# Patient Record
Sex: FEMALE | Race: BLACK OR AFRICAN AMERICAN | URBAN - METROPOLITAN AREA
[De-identification: names, ages, dates, MRNs, and addresses within clinical notes are randomized per-mention and may not be internally consistent; named-entity substitution may affect disease eponyms.]

---

## 2014-04-04 RX ORDER — CARVEDILOL PHOSPHATE 80 MG/1
1 CAPSULE, EXTENDED RELEASE ORAL
Qty: 0 | Refills: 0 | COMMUNITY
Start: 2014-04-04

## 2017-10-18 ENCOUNTER — INPATIENT (INPATIENT)
Facility: HOSPITAL | Age: 67
LOS: 4 days | Discharge: NURSING, REHAB OR LONG-TERM CARE | End: 2017-10-23
Attending: HOSPITALIST | Admitting: HOSPITALIST
Payer: MEDICARE

## 2017-10-18 VITALS
OXYGEN SATURATION: 100 % | TEMPERATURE: 99 F | SYSTOLIC BLOOD PRESSURE: 137 MMHG | RESPIRATION RATE: 17 BRPM | DIASTOLIC BLOOD PRESSURE: 57 MMHG | HEART RATE: 105 BPM

## 2017-10-18 DIAGNOSIS — D64.9 ANEMIA, UNSPECIFIED: ICD-10-CM

## 2017-10-18 DIAGNOSIS — Z29.9 ENCOUNTER FOR PROPHYLACTIC MEASURES, UNSPECIFIED: ICD-10-CM

## 2017-10-18 DIAGNOSIS — Z96.649 PRESENCE OF UNSPECIFIED ARTIFICIAL HIP JOINT: ICD-10-CM

## 2017-10-18 DIAGNOSIS — M10.9 GOUT, UNSPECIFIED: ICD-10-CM

## 2017-10-18 DIAGNOSIS — I25.10 ATHEROSCLEROTIC HEART DISEASE OF NATIVE CORONARY ARTERY WITHOUT ANGINA PECTORIS: ICD-10-CM

## 2017-10-18 DIAGNOSIS — I50.22 CHRONIC SYSTOLIC (CONGESTIVE) HEART FAILURE: ICD-10-CM

## 2017-10-18 LAB
ALBUMIN SERPL ELPH-MCNC: 3 G/DL — LOW (ref 3.3–5)
ALP SERPL-CCNC: 147 U/L — HIGH (ref 40–120)
ALT FLD-CCNC: 6 U/L — SIGNIFICANT CHANGE UP (ref 4–33)
ANISOCYTOSIS BLD QL: SLIGHT — SIGNIFICANT CHANGE UP
APPEARANCE UR: SIGNIFICANT CHANGE UP
AST SERPL-CCNC: 21 U/L — SIGNIFICANT CHANGE UP (ref 4–32)
BACTERIA # UR AUTO: SIGNIFICANT CHANGE UP
BASE EXCESS BLDV CALC-SCNC: 0.2 MMOL/L — SIGNIFICANT CHANGE UP
BASOPHILS # BLD AUTO: 0.01 K/UL — SIGNIFICANT CHANGE UP (ref 0–0.2)
BASOPHILS NFR BLD AUTO: 0.1 % — SIGNIFICANT CHANGE UP (ref 0–2)
BASOPHILS NFR SPEC: 0 % — SIGNIFICANT CHANGE UP (ref 0–2)
BILIRUB SERPL-MCNC: 1.1 MG/DL — SIGNIFICANT CHANGE UP (ref 0.2–1.2)
BILIRUB UR-MCNC: NEGATIVE — SIGNIFICANT CHANGE UP
BLD GP AB SCN SERPL QL: NEGATIVE — SIGNIFICANT CHANGE UP
BLOOD GAS VENOUS - CREATININE: 0.99 MG/DL — SIGNIFICANT CHANGE UP (ref 0.5–1.3)
BLOOD UR QL VISUAL: HIGH
BUN SERPL-MCNC: 16 MG/DL — SIGNIFICANT CHANGE UP (ref 7–23)
CALCIUM SERPL-MCNC: 8.1 MG/DL — LOW (ref 8.4–10.5)
CHLORIDE BLDV-SCNC: 102 MMOL/L — SIGNIFICANT CHANGE UP (ref 96–108)
CHLORIDE SERPL-SCNC: 99 MMOL/L — SIGNIFICANT CHANGE UP (ref 98–107)
CO2 SERPL-SCNC: 23 MMOL/L — SIGNIFICANT CHANGE UP (ref 22–31)
COLOR SPEC: YELLOW — SIGNIFICANT CHANGE UP
CREAT SERPL-MCNC: 0.92 MG/DL — SIGNIFICANT CHANGE UP (ref 0.5–1.3)
EOSINOPHIL # BLD AUTO: 0.06 K/UL — SIGNIFICANT CHANGE UP (ref 0–0.5)
EOSINOPHIL NFR BLD AUTO: 0.7 % — SIGNIFICANT CHANGE UP (ref 0–6)
EOSINOPHIL NFR FLD: 0 % — SIGNIFICANT CHANGE UP (ref 0–6)
GAS PNL BLDV: 134 MMOL/L — LOW (ref 136–146)
GIANT PLATELETS BLD QL SMEAR: PRESENT — SIGNIFICANT CHANGE UP
GLUCOSE BLDV-MCNC: 98 — SIGNIFICANT CHANGE UP (ref 70–99)
GLUCOSE SERPL-MCNC: 96 MG/DL — SIGNIFICANT CHANGE UP (ref 70–99)
GLUCOSE UR-MCNC: NEGATIVE — SIGNIFICANT CHANGE UP
HCO3 BLDV-SCNC: 24 MMOL/L — SIGNIFICANT CHANGE UP (ref 20–27)
HCT VFR BLD CALC: 16.1 % — CRITICAL LOW (ref 34.5–45)
HCT VFR BLDV CALC: 16.7 % — CRITICAL LOW (ref 34.5–45)
HGB BLD-MCNC: 5.2 G/DL — CRITICAL LOW (ref 11.5–15.5)
HGB BLDV-MCNC: 5.3 G/DL — CRITICAL LOW (ref 11.5–15.5)
IMM GRANULOCYTES # BLD AUTO: 0.28 # — SIGNIFICANT CHANGE UP
IMM GRANULOCYTES NFR BLD AUTO: 3.4 % — HIGH (ref 0–1.5)
INR BLD: 0.96 — SIGNIFICANT CHANGE UP (ref 0.88–1.17)
KETONES UR-MCNC: SIGNIFICANT CHANGE UP
LACTATE BLDV-MCNC: 0.9 MMOL/L — SIGNIFICANT CHANGE UP (ref 0.5–2)
LEUKOCYTE ESTERASE UR-ACNC: HIGH
LYMPHOCYTES # BLD AUTO: 1.17 K/UL — SIGNIFICANT CHANGE UP (ref 1–3.3)
LYMPHOCYTES # BLD AUTO: 14.1 % — SIGNIFICANT CHANGE UP (ref 13–44)
LYMPHOCYTES NFR SPEC AUTO: 12 % — LOW (ref 13–44)
MCHC RBC-ENTMCNC: 29.7 PG — SIGNIFICANT CHANGE UP (ref 27–34)
MCHC RBC-ENTMCNC: 32.3 % — SIGNIFICANT CHANGE UP (ref 32–36)
MCV RBC AUTO: 92 FL — SIGNIFICANT CHANGE UP (ref 80–100)
METAMYELOCYTES # FLD: 3 % — HIGH (ref 0–1)
MONOCYTES # BLD AUTO: 0.6 K/UL — SIGNIFICANT CHANGE UP (ref 0–0.9)
MONOCYTES NFR BLD AUTO: 7.2 % — SIGNIFICANT CHANGE UP (ref 2–14)
MONOCYTES NFR BLD: 1 % — LOW (ref 2–9)
MUCOUS THREADS # UR AUTO: SIGNIFICANT CHANGE UP
NEUTROPHIL AB SER-ACNC: 83 % — HIGH (ref 43–77)
NEUTROPHILS # BLD AUTO: 6.16 K/UL — SIGNIFICANT CHANGE UP (ref 1.8–7.4)
NEUTROPHILS NFR BLD AUTO: 74.5 % — SIGNIFICANT CHANGE UP (ref 43–77)
NEUTS BAND # BLD: 1 % — SIGNIFICANT CHANGE UP (ref 0–6)
NITRITE UR-MCNC: NEGATIVE — SIGNIFICANT CHANGE UP
NON-SQ EPI CELLS # UR AUTO: <1 — SIGNIFICANT CHANGE UP
NRBC # FLD: 0.05 — SIGNIFICANT CHANGE UP
OB PNL STL: POSITIVE — SIGNIFICANT CHANGE UP
PCO2 BLDV: 40 MMHG — LOW (ref 41–51)
PH BLDV: 7.4 PH — SIGNIFICANT CHANGE UP (ref 7.32–7.43)
PH UR: 6 — SIGNIFICANT CHANGE UP (ref 4.6–8)
PLATELET # BLD AUTO: 167 K/UL — SIGNIFICANT CHANGE UP (ref 150–400)
PLATELET COUNT - ESTIMATE: NORMAL — SIGNIFICANT CHANGE UP
PMV BLD: 11.5 FL — SIGNIFICANT CHANGE UP (ref 7–13)
PO2 BLDV: 34 MMHG — LOW (ref 35–40)
POIKILOCYTOSIS BLD QL AUTO: SLIGHT — SIGNIFICANT CHANGE UP
POLYCHROMASIA BLD QL SMEAR: SLIGHT — SIGNIFICANT CHANGE UP
POTASSIUM BLDV-SCNC: 3.8 MMOL/L — SIGNIFICANT CHANGE UP (ref 3.4–4.5)
POTASSIUM SERPL-MCNC: 4.1 MMOL/L — SIGNIFICANT CHANGE UP (ref 3.5–5.3)
POTASSIUM SERPL-SCNC: 4.1 MMOL/L — SIGNIFICANT CHANGE UP (ref 3.5–5.3)
PROT SERPL-MCNC: 5.9 G/DL — LOW (ref 6–8.3)
PROT UR-MCNC: 20 — SIGNIFICANT CHANGE UP
PROTHROM AB SERPL-ACNC: 10.7 SEC — SIGNIFICANT CHANGE UP (ref 9.8–13.1)
RBC # BLD: 1.75 M/UL — LOW (ref 3.8–5.2)
RBC # FLD: 14.6 % — HIGH (ref 10.3–14.5)
RBC CASTS # UR COMP ASSIST: SIGNIFICANT CHANGE UP (ref 0–?)
REVIEW TO FOLLOW: YES — SIGNIFICANT CHANGE UP
RH IG SCN BLD-IMP: POSITIVE — SIGNIFICANT CHANGE UP
SAO2 % BLDV: 62.7 % — SIGNIFICANT CHANGE UP (ref 60–85)
SODIUM SERPL-SCNC: 137 MMOL/L — SIGNIFICANT CHANGE UP (ref 135–145)
SP GR SPEC: 1.01 — SIGNIFICANT CHANGE UP (ref 1–1.03)
SQUAMOUS # UR AUTO: SIGNIFICANT CHANGE UP
UROBILINOGEN FLD QL: NORMAL E.U. — SIGNIFICANT CHANGE UP (ref 0.1–0.2)
WBC # BLD: 8.28 K/UL — SIGNIFICANT CHANGE UP (ref 3.8–10.5)
WBC # FLD AUTO: 8.28 K/UL — SIGNIFICANT CHANGE UP (ref 3.8–10.5)
WBC UR QL: HIGH (ref 0–?)

## 2017-10-18 PROCEDURE — 93971 EXTREMITY STUDY: CPT | Mod: 26,LT

## 2017-10-18 PROCEDURE — 71010: CPT | Mod: 26

## 2017-10-18 PROCEDURE — 99223 1ST HOSP IP/OBS HIGH 75: CPT | Mod: AI

## 2017-10-18 PROCEDURE — 73502 X-RAY EXAM HIP UNI 2-3 VIEWS: CPT | Mod: 26,LT

## 2017-10-18 RX ORDER — ASCORBIC ACID 60 MG
1 TABLET,CHEWABLE ORAL
Qty: 0 | Refills: 0 | COMMUNITY

## 2017-10-18 RX ORDER — KETOROLAC TROMETHAMINE 0.5 %
1 DROPS OPHTHALMIC (EYE) DAILY
Qty: 0 | Refills: 0 | Status: DISCONTINUED | OUTPATIENT
Start: 2017-10-18 | End: 2017-10-23

## 2017-10-18 RX ORDER — KETOROLAC TROMETHAMINE 0.5 %
1 DROPS OPHTHALMIC (EYE)
Qty: 0 | Refills: 0 | COMMUNITY

## 2017-10-18 RX ORDER — SENNA PLUS 8.6 MG/1
2 TABLET ORAL AT BEDTIME
Qty: 0 | Refills: 0 | Status: DISCONTINUED | OUTPATIENT
Start: 2017-10-18 | End: 2017-10-23

## 2017-10-18 RX ORDER — ASPIRIN/CALCIUM CARB/MAGNESIUM 324 MG
1 TABLET ORAL
Qty: 0 | Refills: 0 | COMMUNITY

## 2017-10-18 RX ORDER — ENOXAPARIN SODIUM 100 MG/ML
40 INJECTION SUBCUTANEOUS EVERY 24 HOURS
Qty: 0 | Refills: 0 | Status: DISCONTINUED | OUTPATIENT
Start: 2017-10-18 | End: 2017-10-23

## 2017-10-18 RX ORDER — CARVEDILOL PHOSPHATE 80 MG/1
12.5 CAPSULE, EXTENDED RELEASE ORAL EVERY 12 HOURS
Qty: 0 | Refills: 0 | Status: DISCONTINUED | OUTPATIENT
Start: 2017-10-18 | End: 2017-10-23

## 2017-10-18 RX ORDER — ASCORBIC ACID 60 MG
500 TABLET,CHEWABLE ORAL
Qty: 0 | Refills: 0 | Status: DISCONTINUED | OUTPATIENT
Start: 2017-10-18 | End: 2017-10-23

## 2017-10-18 RX ORDER — FOLIC ACID 0.8 MG
1 TABLET ORAL DAILY
Qty: 0 | Refills: 0 | Status: DISCONTINUED | OUTPATIENT
Start: 2017-10-18 | End: 2017-10-23

## 2017-10-18 RX ORDER — ACETAMINOPHEN 500 MG
650 TABLET ORAL EVERY 6 HOURS
Qty: 0 | Refills: 0 | Status: DISCONTINUED | OUTPATIENT
Start: 2017-10-18 | End: 2017-10-23

## 2017-10-18 RX ORDER — AMLODIPINE BESYLATE 2.5 MG/1
5 TABLET ORAL DAILY
Qty: 0 | Refills: 0 | Status: DISCONTINUED | OUTPATIENT
Start: 2017-10-18 | End: 2017-10-23

## 2017-10-18 RX ORDER — DOCUSATE SODIUM 100 MG
1 CAPSULE ORAL
Qty: 0 | Refills: 0 | COMMUNITY

## 2017-10-18 RX ORDER — ALLOPURINOL 300 MG
2 TABLET ORAL
Qty: 0 | Refills: 0 | COMMUNITY

## 2017-10-18 RX ORDER — SODIUM CHLORIDE 9 MG/ML
3 INJECTION INTRAMUSCULAR; INTRAVENOUS; SUBCUTANEOUS EVERY 8 HOURS
Qty: 0 | Refills: 0 | Status: DISCONTINUED | OUTPATIENT
Start: 2017-10-18 | End: 2017-10-23

## 2017-10-18 RX ORDER — OXYCODONE AND ACETAMINOPHEN 5; 325 MG/1; MG/1
1 TABLET ORAL EVERY 6 HOURS
Qty: 0 | Refills: 0 | Status: DISCONTINUED | OUTPATIENT
Start: 2017-10-18 | End: 2017-10-19

## 2017-10-18 RX ORDER — ACETAMINOPHEN 500 MG
1000 TABLET ORAL ONCE
Qty: 0 | Refills: 0 | Status: DISCONTINUED | OUTPATIENT
Start: 2017-10-18 | End: 2017-10-18

## 2017-10-18 RX ORDER — PANTOPRAZOLE SODIUM 20 MG/1
40 TABLET, DELAYED RELEASE ORAL
Qty: 0 | Refills: 0 | Status: DISCONTINUED | OUTPATIENT
Start: 2017-10-18 | End: 2017-10-23

## 2017-10-18 RX ORDER — ALLOPURINOL 300 MG
200 TABLET ORAL DAILY
Qty: 0 | Refills: 0 | Status: DISCONTINUED | OUTPATIENT
Start: 2017-10-18 | End: 2017-10-23

## 2017-10-18 RX ORDER — ASPIRIN/CALCIUM CARB/MAGNESIUM 324 MG
81 TABLET ORAL DAILY
Qty: 0 | Refills: 0 | Status: DISCONTINUED | OUTPATIENT
Start: 2017-10-18 | End: 2017-10-23

## 2017-10-18 RX ORDER — CHOLECALCIFEROL (VITAMIN D3) 125 MCG
1 CAPSULE ORAL
Qty: 0 | Refills: 0 | COMMUNITY

## 2017-10-18 RX ORDER — DOCUSATE SODIUM 100 MG
100 CAPSULE ORAL
Qty: 0 | Refills: 0 | Status: DISCONTINUED | OUTPATIENT
Start: 2017-10-18 | End: 2017-10-23

## 2017-10-18 RX ORDER — PANTOPRAZOLE SODIUM 20 MG/1
80 TABLET, DELAYED RELEASE ORAL ONCE
Qty: 0 | Refills: 0 | Status: COMPLETED | OUTPATIENT
Start: 2017-10-18 | End: 2017-10-18

## 2017-10-18 RX ORDER — INFLUENZA VIRUS VACCINE 15; 15; 15; 15 UG/.5ML; UG/.5ML; UG/.5ML; UG/.5ML
0.5 SUSPENSION INTRAMUSCULAR ONCE
Qty: 0 | Refills: 0 | Status: COMPLETED | OUTPATIENT
Start: 2017-10-18 | End: 2017-10-18

## 2017-10-18 RX ORDER — PANTOPRAZOLE SODIUM 20 MG/1
1 TABLET, DELAYED RELEASE ORAL
Qty: 0 | Refills: 0 | COMMUNITY

## 2017-10-18 RX ORDER — CHOLECALCIFEROL (VITAMIN D3) 125 MCG
2000 CAPSULE ORAL DAILY
Qty: 0 | Refills: 0 | Status: DISCONTINUED | OUTPATIENT
Start: 2017-10-18 | End: 2017-10-23

## 2017-10-18 RX ORDER — FERROUS SULFATE 325(65) MG
1 TABLET ORAL
Qty: 0 | Refills: 0 | COMMUNITY

## 2017-10-18 RX ORDER — ATORVASTATIN CALCIUM 80 MG/1
10 TABLET, FILM COATED ORAL AT BEDTIME
Qty: 0 | Refills: 0 | Status: DISCONTINUED | OUTPATIENT
Start: 2017-10-18 | End: 2017-10-23

## 2017-10-18 RX ORDER — AMLODIPINE BESYLATE 2.5 MG/1
1 TABLET ORAL
Qty: 0 | Refills: 0 | COMMUNITY

## 2017-10-18 RX ORDER — SENNA PLUS 8.6 MG/1
2 TABLET ORAL
Qty: 0 | Refills: 0 | COMMUNITY

## 2017-10-18 RX ORDER — FERROUS SULFATE 325(65) MG
325 TABLET ORAL DAILY
Qty: 0 | Refills: 0 | Status: DISCONTINUED | OUTPATIENT
Start: 2017-10-18 | End: 2017-10-18

## 2017-10-18 RX ORDER — ACETAMINOPHEN 500 MG
975 TABLET ORAL ONCE
Qty: 0 | Refills: 0 | Status: COMPLETED | OUTPATIENT
Start: 2017-10-18 | End: 2017-10-18

## 2017-10-18 RX ORDER — LISINOPRIL 2.5 MG/1
20 TABLET ORAL DAILY
Qty: 0 | Refills: 0 | Status: DISCONTINUED | OUTPATIENT
Start: 2017-10-18 | End: 2017-10-23

## 2017-10-18 RX ADMIN — CARVEDILOL PHOSPHATE 12.5 MILLIGRAM(S): 80 CAPSULE, EXTENDED RELEASE ORAL at 21:29

## 2017-10-18 RX ADMIN — ATORVASTATIN CALCIUM 10 MILLIGRAM(S): 80 TABLET, FILM COATED ORAL at 21:29

## 2017-10-18 RX ADMIN — ENOXAPARIN SODIUM 40 MILLIGRAM(S): 100 INJECTION SUBCUTANEOUS at 21:29

## 2017-10-18 RX ADMIN — PANTOPRAZOLE SODIUM 80 MILLIGRAM(S): 20 TABLET, DELAYED RELEASE ORAL at 11:04

## 2017-10-18 RX ADMIN — Medication 100 MILLIGRAM(S): at 21:29

## 2017-10-18 RX ADMIN — Medication 500 MILLIGRAM(S): at 21:29

## 2017-10-18 RX ADMIN — SODIUM CHLORIDE 3 MILLILITER(S): 9 INJECTION INTRAMUSCULAR; INTRAVENOUS; SUBCUTANEOUS at 21:30

## 2017-10-18 RX ADMIN — SENNA PLUS 2 TABLET(S): 8.6 TABLET ORAL at 21:29

## 2017-10-18 RX ADMIN — Medication 975 MILLIGRAM(S): at 11:14

## 2017-10-18 NOTE — H&P ADULT - HISTORY OF PRESENT ILLNESS
67 F with PMHx of HTN, HLD, Gout, chronic systolic CHF s/p AICD, long-standing hx of anemia who is s/p recent hip sx and transitioned to rehab noted with hgb 6 on routine labs, sent to ED for further eval. patient denies any complaints and otherwise would not have come to the hospital. patient's old records reviewed. she was hospitalized in 2014 and found with anemia at that time, per documentation extensive w/u was done including iron studies electrophoresis, immunofixation, and skeletal survey which were all unremarkable. patient was being followed by heme and eventually cyndy BM bx and was discharged to follow up results as out-patient. at that time reports of hemoptysis, seen by ENT, refused EGD, but no obvious source of bleed otherwise no overt bleed at this time. [employer] checked for heme follow up, no records found. Bone marrow bx noted with increased iron stores and plasmacytosis.

## 2017-10-18 NOTE — ED ADULT NURSE NOTE - PMH
AICD (automatic cardioverter/defibrillator) present    Anemia    CHF (congestive heart failure)    Essential hypertension, benign    H/O edema

## 2017-10-18 NOTE — PATIENT PROFILE ADULT. - NS PRO INDICAT FLU
Patient is 18 years or older Patient is 18 years or older/Patient is 18 to 64 years of age with chronic diseases or conditions

## 2017-10-18 NOTE — ED PROVIDER NOTE - OBJECTIVE STATEMENT
Pt is 68 y/o female with pmhx of NICM, CKD, chronic anemia here from [facility] rehab for low H&H. Pt recently underwent Lt hip replacement surgery (non-traumatic) at [facility] (10/12), had blood work done at [facility] which showed anemia (6.4 yesterday, 5.4 today am). Pt herself denies cP, SOB, denies dizziness, weakness. (+) LLE pain and edema (admits that she has been c/o leg pain upon discharge from [facility]), denies fever, chills, nausea, vomiting but has been having abd pain, also no BM since sx as pt has been taking Percocet. [facility] ortho - Gerry Locke. Pt is on Lovenox for DVT prophylaxis. (-) melena. Pt is 66 y/o female with pmhx of NICM, CKD, chronic anemia here from [facility] rehab for low H&H. Pt recently underwent Lt hip replacement surgery (non-traumatic) at [facility] (10/12), had blood work done at [facility] which showed anemia (6.4 yesterday, 5.4 today am). Pt herself denies cP, SOB, denies dizziness, weakness. (+) LLE pain and edema (admits that she has been c/o leg pain upon discharge from [facility]), denies fever, chills, nausea, vomiting but has been having abd pain, also no BM since sx as pt has been taking Percocet. [facility] ortho - Gerry Locke. Pt is on Lovenox for DVT prophylaxis. (-) melena.    Attendinyo female presents with anemia.  Pt is in rehab facility recovering from left hip replacement surgery done at [facility] last week.  PT states she feels fine and would not have come to hospital if they did not tell her her hemoglobin was low.  Also having left leg swelling which has been ongoing since discharge from [facility].

## 2017-10-18 NOTE — ED PROVIDER NOTE - PROGRESS NOTE DETAILS
GEMMA Johnston  - spoke with pt's orthopedist Dr Gerry Locke - ([phone]) - will manage anemia, if there is any ortho issue we will call him.

## 2017-10-18 NOTE — H&P ADULT - PROBLEM SELECTOR PLAN 6
noted on both heparin and Lovenox PPX at rehab. will resume Lovenox PPX. no overt bleed and high risk of DVT in setting of recent hip replacement, will therefore cw DVT PPX despite occult blood + and anemia

## 2017-10-18 NOTE — ED ADULT NURSE REASSESSMENT NOTE - NS ED NURSE REASSESS COMMENT FT1
RN informed by [employer] that pt had 4 runs of V-tach. Dr. Lane BELTRAN notified. [facility] also informed. [facility]

## 2017-10-18 NOTE — H&P ADULT - PROBLEM SELECTOR PLAN 5
noted with both heparin and Lovenox subcut PPX at rehab, will resume Lovenox subcut only. LE US negative for DVT. pain control, X-ray with normal post-op findings.

## 2017-10-18 NOTE — ED ADULT NURSE NOTE - CHIEF COMPLAINT QUOTE
Patient brought to ER by EMS from [facility] for c/o abnormal labs. Low hemoglobin. 6.4 yesterday and today it is 5.4. She had left hip surgery on Thursday and lost a lot of blood.

## 2017-10-18 NOTE — ED PROVIDER NOTE - MEDICAL DECISION MAKING DETAILS
1) anemia, concern for GI bleed - labs, T&S will consent for 2 Units; 2) pt with low grade temp rectally, concern for post infection - cxr, ua, Le duplex, ortho consult (?);

## 2017-10-18 NOTE — ED ADULT TRIAGE NOTE - CHIEF COMPLAINT QUOTE
Patient brought to ER by EMS from [facility] for c/o abnormal labs. Low hemoglobin. 6.4 yesterday and today it is 5.4. She had left hip surgery on Thursday and lost a lot of blood.

## 2017-10-18 NOTE — PATIENT PROFILE ADULT. - BLOOD AVOIDANCE/RESTRICTIONS, PROFILE
patient concern about blood borne infection/concerns discussed. pt says she would need more information from her doctor if she would need a blood transfusion.

## 2017-10-18 NOTE — H&P ADULT - PROBLEM SELECTOR PLAN 1
bone marrow non-diagnostic, already on iron and FA therapy. no overt bleed. for 2 units PRBC now. consider heme reeval in AM to discuss bone marrow findings and if any other work up or interventions warranted. check post transfusion CBC occult + on this admission, prior bone marrow Bx non-diagnostic, already on iron and FA therapy. no overt bleed. for 2 units PRBC now. will have GI eval for possible colonoscopy. will recheck basic anemia work up and consider heme reeval in AM to discuss bone marrow findings and if any other work up or interventions warranted. check post transfusion CBC occult + on this admission, prior bone marrow Bx non-diagnostic, already on iron and FA therapy. no overt bleed. for 2 units PRBC now. will have GI eval for possible colonoscopy. hold iron for now. will recheck basic anemia work up and consider heme reeval in AM to discuss bone marrow findings and if any other work up or interventions warranted. check post transfusion CBC

## 2017-10-18 NOTE — ED ADULT NURSE NOTE - OBJECTIVE STATEMENT
pt is a 64 y/o female sent from rehab for abnormal labs. pt H&H is low according to the lab results sent in chart. pt had surgery on her left hip last week. pt has a surgical wound with wound vac attached to left hip area. Dressing is clean dry and intact.

## 2017-10-18 NOTE — ED ADULT NURSE REASSESSMENT NOTE - NS ED NURSE REASSESS COMMENT FT1
pt pre-transfusion temp 99.9F. Spoke with Attending Dr. Whitfield. Ok to start transfusion with current temp. pt previously received Tylenol. pt VSS as charted. pt tolerating transfusion at this point. pt in no acute distress. Will continue to monitor.

## 2017-10-19 ENCOUNTER — TRANSCRIPTION ENCOUNTER (OUTPATIENT)
Age: 67
End: 2017-10-19

## 2017-10-19 LAB
ALBUMIN SERPL ELPH-MCNC: 2.8 G/DL — LOW (ref 3.3–5)
ALP SERPL-CCNC: 144 U/L — HIGH (ref 40–120)
ALT FLD-CCNC: 5 U/L — SIGNIFICANT CHANGE UP (ref 4–33)
AST SERPL-CCNC: 16 U/L — SIGNIFICANT CHANGE UP (ref 4–32)
BASOPHILS # BLD AUTO: 0.02 K/UL — SIGNIFICANT CHANGE UP (ref 0–0.2)
BASOPHILS NFR BLD AUTO: 0.3 % — SIGNIFICANT CHANGE UP (ref 0–2)
BILIRUB SERPL-MCNC: 1.1 MG/DL — SIGNIFICANT CHANGE UP (ref 0.2–1.2)
BUN SERPL-MCNC: 17 MG/DL — SIGNIFICANT CHANGE UP (ref 7–23)
CALCIUM SERPL-MCNC: 7.7 MG/DL — LOW (ref 8.4–10.5)
CHLORIDE SERPL-SCNC: 103 MMOL/L — SIGNIFICANT CHANGE UP (ref 98–107)
CO2 SERPL-SCNC: 24 MMOL/L — SIGNIFICANT CHANGE UP (ref 22–31)
CREAT SERPL-MCNC: 1.09 MG/DL — SIGNIFICANT CHANGE UP (ref 0.5–1.3)
EOSINOPHIL # BLD AUTO: 0.07 K/UL — SIGNIFICANT CHANGE UP (ref 0–0.5)
EOSINOPHIL NFR BLD AUTO: 0.9 % — SIGNIFICANT CHANGE UP (ref 0–6)
FOLATE SERPL-MCNC: > 20 NG/ML — HIGH (ref 4.7–20)
GLUCOSE SERPL-MCNC: 94 MG/DL — SIGNIFICANT CHANGE UP (ref 70–99)
HAPTOGLOB SERPL-MCNC: 277 MG/DL — HIGH (ref 34–200)
HCT VFR BLD CALC: 23.9 % — LOW (ref 34.5–45)
HCT VFR BLD CALC: 24.3 % — LOW (ref 34.5–45)
HCT VFR BLD CALC: 25.8 % — LOW (ref 34.5–45)
HGB BLD-MCNC: 8.2 G/DL — LOW (ref 11.5–15.5)
HGB BLD-MCNC: 8.3 G/DL — LOW (ref 11.5–15.5)
HGB BLD-MCNC: 8.6 G/DL — LOW (ref 11.5–15.5)
IMM GRANULOCYTES # BLD AUTO: 0.41 # — SIGNIFICANT CHANGE UP
IMM GRANULOCYTES NFR BLD AUTO: 5.5 % — HIGH (ref 0–1.5)
IRON SATN MFR SERPL: 133 UG/DL — LOW (ref 140–530)
IRON SATN MFR SERPL: 39 UG/DL — SIGNIFICANT CHANGE UP (ref 30–160)
LDH SERPL L TO P-CCNC: 250 U/L — HIGH (ref 135–225)
LYMPHOCYTES # BLD AUTO: 1.37 K/UL — SIGNIFICANT CHANGE UP (ref 1–3.3)
LYMPHOCYTES # BLD AUTO: 18.4 % — SIGNIFICANT CHANGE UP (ref 13–44)
MCHC RBC-ENTMCNC: 29.2 PG — SIGNIFICANT CHANGE UP (ref 27–34)
MCHC RBC-ENTMCNC: 29.9 PG — SIGNIFICANT CHANGE UP (ref 27–34)
MCHC RBC-ENTMCNC: 30.1 PG — SIGNIFICANT CHANGE UP (ref 27–34)
MCHC RBC-ENTMCNC: 33.3 % — SIGNIFICANT CHANGE UP (ref 32–36)
MCHC RBC-ENTMCNC: 34.2 % — SIGNIFICANT CHANGE UP (ref 32–36)
MCHC RBC-ENTMCNC: 34.3 % — SIGNIFICANT CHANGE UP (ref 32–36)
MCV RBC AUTO: 87.4 FL — SIGNIFICANT CHANGE UP (ref 80–100)
MCV RBC AUTO: 87.5 FL — SIGNIFICANT CHANGE UP (ref 80–100)
MCV RBC AUTO: 87.9 FL — SIGNIFICANT CHANGE UP (ref 80–100)
MONOCYTES # BLD AUTO: 0.66 K/UL — SIGNIFICANT CHANGE UP (ref 0–0.9)
MONOCYTES NFR BLD AUTO: 8.9 % — SIGNIFICANT CHANGE UP (ref 2–14)
NEUTROPHILS # BLD AUTO: 4.91 K/UL — SIGNIFICANT CHANGE UP (ref 1.8–7.4)
NEUTROPHILS NFR BLD AUTO: 66 % — SIGNIFICANT CHANGE UP (ref 43–77)
NRBC # FLD: 0.08 — SIGNIFICANT CHANGE UP
NRBC # FLD: 0.08 — SIGNIFICANT CHANGE UP
NRBC # FLD: 0.09 — SIGNIFICANT CHANGE UP
NRBC FLD-RTO: 1.1 — SIGNIFICANT CHANGE UP
NRBC FLD-RTO: 1.1 — SIGNIFICANT CHANGE UP
NRBC FLD-RTO: 1.2 — SIGNIFICANT CHANGE UP
PLATELET # BLD AUTO: 163 K/UL — SIGNIFICANT CHANGE UP (ref 150–400)
PLATELET # BLD AUTO: 180 K/UL — SIGNIFICANT CHANGE UP (ref 150–400)
PLATELET # BLD AUTO: 198 K/UL — SIGNIFICANT CHANGE UP (ref 150–400)
PMV BLD: 10.7 FL — SIGNIFICANT CHANGE UP (ref 7–13)
PMV BLD: 10.8 FL — SIGNIFICANT CHANGE UP (ref 7–13)
PMV BLD: 11 FL — SIGNIFICANT CHANGE UP (ref 7–13)
POTASSIUM SERPL-MCNC: 3.9 MMOL/L — SIGNIFICANT CHANGE UP (ref 3.5–5.3)
POTASSIUM SERPL-SCNC: 3.9 MMOL/L — SIGNIFICANT CHANGE UP (ref 3.5–5.3)
PROT SERPL-MCNC: 5.7 G/DL — LOW (ref 6–8.3)
RBC # BLD: 2.72 M/UL — LOW (ref 3.8–5.2)
RBC # BLD: 2.78 M/UL — LOW (ref 3.8–5.2)
RBC # BLD: 2.95 M/UL — LOW (ref 3.8–5.2)
RBC # FLD: 15.2 % — HIGH (ref 10.3–14.5)
RBC # FLD: 15.9 % — HIGH (ref 10.3–14.5)
RBC # FLD: 16 % — HIGH (ref 10.3–14.5)
RETICS #: 0.1 10X6/UL — HIGH (ref 0.02–0.07)
RETICS/RBC NFR: 3.5 % — HIGH (ref 0.5–2.5)
SODIUM SERPL-SCNC: 139 MMOL/L — SIGNIFICANT CHANGE UP (ref 135–145)
SPECIMEN SOURCE: SIGNIFICANT CHANGE UP
SPECIMEN SOURCE: SIGNIFICANT CHANGE UP
TSH SERPL-MCNC: 2.69 UIU/ML — SIGNIFICANT CHANGE UP (ref 0.27–4.2)
UIBC SERPL-MCNC: 94 UG/DL — LOW (ref 110–370)
VIT B12 SERPL-MCNC: > 2000 PG/ML — HIGH (ref 200–900)
WBC # BLD: 7.44 K/UL — SIGNIFICANT CHANGE UP (ref 3.8–10.5)
WBC # BLD: 7.57 K/UL — SIGNIFICANT CHANGE UP (ref 3.8–10.5)
WBC # BLD: 7.8 K/UL — SIGNIFICANT CHANGE UP (ref 3.8–10.5)
WBC # FLD AUTO: 7.44 K/UL — SIGNIFICANT CHANGE UP (ref 3.8–10.5)
WBC # FLD AUTO: 7.57 K/UL — SIGNIFICANT CHANGE UP (ref 3.8–10.5)
WBC # FLD AUTO: 7.8 K/UL — SIGNIFICANT CHANGE UP (ref 3.8–10.5)

## 2017-10-19 PROCEDURE — 99233 SBSQ HOSP IP/OBS HIGH 50: CPT

## 2017-10-19 PROCEDURE — 99222 1ST HOSP IP/OBS MODERATE 55: CPT | Mod: GC

## 2017-10-19 RX ORDER — OXYCODONE AND ACETAMINOPHEN 5; 325 MG/1; MG/1
1 TABLET ORAL EVERY 6 HOURS
Qty: 0 | Refills: 0 | Status: DISCONTINUED | OUTPATIENT
Start: 2017-10-19 | End: 2017-10-23

## 2017-10-19 RX ADMIN — Medication 1 DROP(S): at 14:43

## 2017-10-19 RX ADMIN — AMLODIPINE BESYLATE 5 MILLIGRAM(S): 2.5 TABLET ORAL at 05:52

## 2017-10-19 RX ADMIN — SODIUM CHLORIDE 3 MILLILITER(S): 9 INJECTION INTRAMUSCULAR; INTRAVENOUS; SUBCUTANEOUS at 21:19

## 2017-10-19 RX ADMIN — Medication 500 MILLIGRAM(S): at 21:28

## 2017-10-19 RX ADMIN — OXYCODONE AND ACETAMINOPHEN 1 TABLET(S): 5; 325 TABLET ORAL at 23:20

## 2017-10-19 RX ADMIN — SODIUM CHLORIDE 3 MILLILITER(S): 9 INJECTION INTRAMUSCULAR; INTRAVENOUS; SUBCUTANEOUS at 14:00

## 2017-10-19 RX ADMIN — OXYCODONE AND ACETAMINOPHEN 1 TABLET(S): 5; 325 TABLET ORAL at 23:59

## 2017-10-19 RX ADMIN — Medication 2000 UNIT(S): at 14:42

## 2017-10-19 RX ADMIN — SODIUM CHLORIDE 3 MILLILITER(S): 9 INJECTION INTRAMUSCULAR; INTRAVENOUS; SUBCUTANEOUS at 05:51

## 2017-10-19 RX ADMIN — Medication 1 MILLIGRAM(S): at 12:26

## 2017-10-19 RX ADMIN — Medication 81 MILLIGRAM(S): at 12:26

## 2017-10-19 RX ADMIN — LISINOPRIL 20 MILLIGRAM(S): 2.5 TABLET ORAL at 05:52

## 2017-10-19 RX ADMIN — PANTOPRAZOLE SODIUM 40 MILLIGRAM(S): 20 TABLET, DELAYED RELEASE ORAL at 05:52

## 2017-10-19 RX ADMIN — Medication 650 MILLIGRAM(S): at 14:47

## 2017-10-19 RX ADMIN — CARVEDILOL PHOSPHATE 12.5 MILLIGRAM(S): 80 CAPSULE, EXTENDED RELEASE ORAL at 05:52

## 2017-10-19 RX ADMIN — CARVEDILOL PHOSPHATE 12.5 MILLIGRAM(S): 80 CAPSULE, EXTENDED RELEASE ORAL at 21:30

## 2017-10-19 RX ADMIN — Medication 1 TABLET(S): at 12:26

## 2017-10-19 RX ADMIN — Medication 500 MILLIGRAM(S): at 06:01

## 2017-10-19 RX ADMIN — Medication 650 MILLIGRAM(S): at 15:30

## 2017-10-19 RX ADMIN — ATORVASTATIN CALCIUM 10 MILLIGRAM(S): 80 TABLET, FILM COATED ORAL at 21:28

## 2017-10-19 RX ADMIN — Medication 200 MILLIGRAM(S): at 12:26

## 2017-10-19 RX ADMIN — ENOXAPARIN SODIUM 40 MILLIGRAM(S): 100 INJECTION SUBCUTANEOUS at 21:18

## 2017-10-19 NOTE — DISCHARGE NOTE ADULT - CARE PROVIDER_API CALL
Dr. Locke-[facility],   Phone: [phone]  Fax: (       - Dr. Read-[facility],   Phone: [phone]  Fax: (       -

## 2017-10-19 NOTE — PROGRESS NOTE ADULT - SUBJECTIVE AND OBJECTIVE BOX
Patient is a 67y old  Female who presents with a chief complaint of spitting up blood and heart was beating fast (18 Oct 2017 19:13)      SUBJECTIVE / OVERNIGHT EVENTS: pt seen at 1145a- no pain- felt fine- was just sent to rehab then comes to [facility] w/ low hgb s/p recent hip surgery    MEDICATIONS  (STANDING):  allopurinol 200 milliGRAM(s) Oral daily  amLODIPine   Tablet 5 milliGRAM(s) Oral daily  ascorbic acid 500 milliGRAM(s) Oral two times a day  aspirin enteric coated 81 milliGRAM(s) Oral daily  atorvastatin 10 milliGRAM(s) Oral at bedtime  carvedilol 12.5 milliGRAM(s) Oral every 12 hours  cholecalciferol 2000 Unit(s) Oral daily  docusate sodium 100 milliGRAM(s) Oral two times a day  enoxaparin Injectable 40 milliGRAM(s) SubCutaneous every 24 hours  folic acid 1 milliGRAM(s) Oral daily  influenza   Vaccine 0.5 milliLiter(s) IntraMuscular once  ketorolac 0.5% Ophthalmic Solution 1 Drop(s) Both EYES daily  lisinopril 20 milliGRAM(s) Oral daily  multivitamin 1 Tablet(s) Oral daily  pantoprazole    Tablet 40 milliGRAM(s) Oral before breakfast  senna 2 Tablet(s) Oral at bedtime  sodium chloride 0.9% lock flush 3 milliLiter(s) IV Push every 8 hours    MEDICATIONS  (PRN):  acetaminophen   Tablet. 650 milliGRAM(s) Oral every 6 hours PRN Mild Pain (1 - 3)  oxyCODONE    5 mG/acetaminophen 325 mG 1 Tablet(s) Oral every 6 hours PRN mod-sever pain      Meds ordered within last 24hours  influenza   Vaccine:   0.5 milliLiter(s), IntraMuscular, once  Administration Instructions: Shake well and refrigerate.  If vaccine is to be given at time of discharge, please allow a miniumum of 30 minutes for patient observation.  If to be given concomitantly with Pneumococcal Vaccine, please use a different arm for each vaccination.  This is (10-18 @ :22)      T(C): 37.4 (10-19-17 @ 15:15), Max: 37.4 (10-19-17 @ 15:15)  HR: 92 (10-19-17 @ 15:15) (92 - 105)  BP: 138/66 (10-19-17 @ 15:15) (118/59 - 173/86)  RR: 18 (10-19-17 @ 15:15) (17 - 18)  SpO2: 100% (10-19-17 @ 15:15) (100% - 100%)    CAPILLARY BLOOD GLUCOSE        I&O's Summary      PHYSICAL EXAM:  GENERAL: NAD  CHEST/LUNG: Clear to auscultation bilaterally; No wheeze  HEART: Regular rate and rhythm; No murmurs, rubs, or gallops  ABDOMEN: Soft, Nontender, Nondistended; Bowel sounds present  EXTREMITIES:  No clubbing, cyanosis, or edema      LABS:                        8.6    7.80  )-----------( 180      ( 19 Oct 2017 06:08 )             25.8     10-19    139  |  103  |  17  ----------------------------<  94  3.9   |  24  |  1.09    Ca    7.7<L>      19 Oct 2017 02:28    TPro  5.7<L>  /  Alb  2.8<L>  /  TBili  1.1  /  DBili  x   /  AST  16  /  ALT  5   /  AlkPhos  144<H>  10-19    PT/INR - ( 18 Oct 2017 09:15 )   PT: 10.7 SEC;   INR: 0.96                Urinalysis Basic - ( 18 Oct 2017 09:06 )    Color: YELLOW / Appearance: HAZY / S.009 / pH: 6.0  Gluc: NEGATIVE / Ketone: SMALL  / Bili: NEGATIVE / Urobili: NORMAL E.U.   Blood: TRACE / Protein: 20 / Nitrite: NEGATIVE   Leuk Esterase: LARGE / RBC: 0-2 / WBC 5-10   Sq Epi: OCC / Non Sq Epi: x / Bacteria: FEW        RADIOLOGY & ADDITIONAL TESTS:    Imaging Personally Reviewed:    Consultant(s) Notes Reviewed:      Care Discussed with Consultants/Other Providers: Patient is a 67y old  Female who presents with a chief complaint of spitting up blood and heart was beating fast (18 Oct 2017 19:13)      SUBJECTIVE / OVERNIGHT EVENTS: pt seen at 1145a- no pain- felt fine- was just sent to rehab then comes to [facility] w/ low hgb s/p recent hip surgery    MEDICATIONS  (STANDING):  allopurinol 200 milliGRAM(s) Oral daily  amLODIPine   Tablet 5 milliGRAM(s) Oral daily  ascorbic acid 500 milliGRAM(s) Oral two times a day  aspirin enteric coated 81 milliGRAM(s) Oral daily  atorvastatin 10 milliGRAM(s) Oral at bedtime  carvedilol 12.5 milliGRAM(s) Oral every 12 hours  cholecalciferol 2000 Unit(s) Oral daily  docusate sodium 100 milliGRAM(s) Oral two times a day  enoxaparin Injectable 40 milliGRAM(s) SubCutaneous every 24 hours  folic acid 1 milliGRAM(s) Oral daily  influenza   Vaccine 0.5 milliLiter(s) IntraMuscular once  ketorolac 0.5% Ophthalmic Solution 1 Drop(s) Both EYES daily  lisinopril 20 milliGRAM(s) Oral daily  multivitamin 1 Tablet(s) Oral daily  pantoprazole    Tablet 40 milliGRAM(s) Oral before breakfast  senna 2 Tablet(s) Oral at bedtime  sodium chloride 0.9% lock flush 3 milliLiter(s) IV Push every 8 hours    MEDICATIONS  (PRN):  acetaminophen   Tablet. 650 milliGRAM(s) Oral every 6 hours PRN Mild Pain (1 - 3)  oxyCODONE    5 mG/acetaminophen 325 mG 1 Tablet(s) Oral every 6 hours PRN mod-sever pain      Meds ordered within last 24hours  influenza   Vaccine:   0.5 milliLiter(s), IntraMuscular, once  Administration Instructions: Shake well and refrigerate.  If vaccine is to be given at time of discharge, please allow a miniumum of 30 minutes for patient observation.  If to be given concomitantly with Pneumococcal Vaccine, please use a different arm for each vaccination.  This is (10-18 @ :22)      T(C): 37.4 (10-19-17 @ 15:15), Max: 37.4 (10-19-17 @ 15:15)  HR: 92 (10-19-17 @ 15:15) (92 - 105)  BP: 138/66 (10-19-17 @ 15:15) (118/59 - 173/86)  RR: 18 (10-19-17 @ 15:15) (17 - 18)  SpO2: 100% (10-19-17 @ 15:15) (100% - 100%)    CAPILLARY BLOOD GLUCOSE        I&O's Summary      PHYSICAL EXAM:  GENERAL: NAD  CHEST/LUNG: Clear to auscultation bilaterally; No wheeze  HEART: Regular rate and rhythm; No murmurs, rubs, or gallops  ABDOMEN: Soft, Nontender, Nondistended; Bowel sounds present  EXTREMITIES:  left leg edema 1+      LABS:                        8.6    7.80  )-----------( 180      ( 19 Oct 2017 06:08 )             25.8     10-19    139  |  103  |  17  ----------------------------<  94  3.9   |  24  |  1.09    Ca    7.7<L>      19 Oct 2017 02:28    TPro  5.7<L>  /  Alb  2.8<L>  /  TBili  1.1  /  DBili  x   /  AST  16  /  ALT  5   /  AlkPhos  144<H>  10-19    PT/INR - ( 18 Oct 2017 09:15 )   PT: 10.7 SEC;   INR: 0.96                Urinalysis Basic - ( 18 Oct 2017 09:06 )    Color: YELLOW / Appearance: HAZY / S.009 / pH: 6.0  Gluc: NEGATIVE / Ketone: SMALL  / Bili: NEGATIVE / Urobili: NORMAL E.U.   Blood: TRACE / Protein: 20 / Nitrite: NEGATIVE   Leuk Esterase: LARGE / RBC: 0-2 / WBC 5-10   Sq Epi: OCC / Non Sq Epi: x / Bacteria: FEW        RADIOLOGY & ADDITIONAL TESTS:    Imaging Personally Reviewed:    Consultant(s) Notes Reviewed:      Care Discussed with Consultants/Other Providers:

## 2017-10-19 NOTE — CONSULT NOTE ADULT - SUBJECTIVE AND OBJECTIVE BOX
Chief Complaint:  Patient is a 67y old  Female who presents with a chief complaint of spitting up blood and heart was beating fast (18 Oct 2017 19:13)      HPI:  Pt is a 66 y/o F with a PMH of chronic anemia, CKD, NICM, and CHF presenting to the ED after findings of low H&H by her primary care doctor.    In , she was hospitalized and worked up for her anemia. At that time, iron studies, immunofixation, skeletal survey, and bone marrow biopsy were all negative. At that time, she also reported an episode of hemoptysis but refused EGD for evaluation. Denies seeing hematology as outpatient. Last colonoscopy was >10 years ago, reported normal by patient.    On admission, pt Hb of 5.9. MCV normal. Iron studies showed low TIBC, Unsaturated binding. Iron serum of 39. Vit b12 and folate normal. Ferritin was not checked. Retic count of 3. FOBT was +. Now, the patient reports that she has the anemia for many years but has not felt symptomatic. She denies feeling lightheaded, SOB, chest pain, nausea, vomiting, abdominal pain, dysuria, hematuria, melena, and hematochezia.     Allergies:  No Known Allergies      Home Medications:    Hospital Medications:  acetaminophen   Tablet. 650 milliGRAM(s) Oral every 6 hours PRN  allopurinol 200 milliGRAM(s) Oral daily  amLODIPine   Tablet 5 milliGRAM(s) Oral daily  ascorbic acid 500 milliGRAM(s) Oral two times a day  aspirin enteric coated 81 milliGRAM(s) Oral daily  atorvastatin 10 milliGRAM(s) Oral at bedtime  carvedilol 12.5 milliGRAM(s) Oral every 12 hours  cholecalciferol 2000 Unit(s) Oral daily  docusate sodium 100 milliGRAM(s) Oral two times a day  enoxaparin Injectable 40 milliGRAM(s) SubCutaneous every 24 hours  folic acid 1 milliGRAM(s) Oral daily  influenza   Vaccine 0.5 milliLiter(s) IntraMuscular once  ketorolac 0.5% Ophthalmic Solution 1 Drop(s) Both EYES daily  lisinopril 20 milliGRAM(s) Oral daily  multivitamin 1 Tablet(s) Oral daily  oxyCODONE    5 mG/acetaminophen 325 mG 1 Tablet(s) Oral every 6 hours PRN  pantoprazole    Tablet 40 milliGRAM(s) Oral before breakfast  senna 2 Tablet(s) Oral at bedtime  sodium chloride 0.9% lock flush 3 milliLiter(s) IV Push every 8 hours      PMHX/PSHX:  AICD (automatic cardioverter/defibrillator) present  CHF (congestive heart failure)  H/O edema  Anemia  Essential hypertension, benign  History of total hip replacement      Family history:      Social History:     ROS:     General:  No wt loss, fevers, chills, night sweats, fatigue,   Eyes:  Good vision, no reported pain  ENT:  No sore throat, pain, runny nose, dysphagia  CV:  No pain, palpitations, hypo/hypertension  Resp:  No dyspnea, cough, tachypnea, wheezing  GI:  See HPI  :  No pain, bleeding, incontinence, nocturia  Muscle:  No pain, weakness  Neuro:  No weakness, tingling, memory problems  Psych:  No fatigue, insomnia, mood problems, depression  Endocrine:  No polyuria, polydipsia, cold/heat intolerance  Heme:  No petechiae, ecchymosis, easy bruisability  Skin:  No rash, edema      PHYSICAL EXAM:     GENERAL:  Appears stated age, well-groomed, well-nourished, no distress  HEENT:  NC/AT,  conjunctivae clear and pink,  no JVD  CHEST:  Full & symmetric excursion, no increased effort, breath sounds clear  HEART:  Regular rhythm, S1, S2, no murmur/rub/S3/S4, no abdominal bruit, no edema  ABDOMEN:  Soft, non-tender, non-distended, normoactive bowel sounds,  no masses ,  EXTREMITIES:  no cyanosis,clubbing or edema  SKIN:  No rash/erythema/ecchymoses/petechiae/wounds/abscess/warm/dry  NEURO:  Alert, oriented    Vital Signs:  Vital Signs Last 24 Hrs  T(C): 37 (19 Oct 2017 05:50), Max: 37.7 (18 Oct 2017 14:00)  T(F): 98.6 (19 Oct 2017 05:50), Max: 99.8 (18 Oct 2017 14:00)  HR: 98 (19 Oct 2017 06:03) (98 - 105)  BP: 150/80 (19 Oct 2017 06:03) (102/76 - 173/86)  BP(mean): 84 (18 Oct 2017 14:58) (84 - 84)  RR: 18 (19 Oct 2017 05:50) (12 - 18)  SpO2: 100% (19 Oct 2017 05:50) (100% - 100%)  Daily Height in cm: 162.56 (18 Oct 2017 18:02)    Daily     LABS:                        8.6    7.80  )-----------( 180      ( 19 Oct 2017 06:08 )             25.8     10-    139  |  103  |  17  ----------------------------<  94  3.9   |  24  |  1.09    Ca    7.7<L>      19 Oct 2017 02:28    TPro  5.7<L>  /  Alb  2.8<L>  /  TBili  1.1  /  DBili  x   /  AST  16  /  ALT  5   /  AlkPhos  144<H>  10-19    LIVER FUNCTIONS - ( 19 Oct 2017 02:28 )  Alb: 2.8 g/dL / Pro: 5.7 g/dL / ALK PHOS: 144 u/L / ALT: 5 u/L / AST: 16 u/L / GGT: x           PT/INR - ( 18 Oct 2017 09:15 )   PT: 10.7 SEC;   INR: 0.96            Urinalysis Basic - ( 18 Oct 2017 09:06 )    Color: YELLOW / Appearance: HAZY / S.009 / pH: 6.0  Gluc: NEGATIVE / Ketone: SMALL  / Bili: NEGATIVE / Urobili: NORMAL E.U.   Blood: TRACE / Protein: 20 / Nitrite: NEGATIVE   Leuk Esterase: LARGE / RBC: 0-2 / WBC 5-10   Sq Epi: OCC / Non Sq Epi: x / Bacteria: FEW          Imaging: Chief Complaint:  Patient is a 67y old  Female who presents with a chief complaint of spitting up blood and heart was beating fast (18 Oct 2017 19:13)      HPI:  Pt is a 66 y/o F with a PMH of chronic anemia, CKD, NICM, s/p recent hip surgery presenting to the ED after findings of low H&H by her primary care doctor.    In , she was hospitalized and worked up for her anemia. At that time, iron studies, immunofixation, skeletal survey, and bone marrow biopsy were all negative. At that time, she also reported an episode of hemoptysis but refused EGD for evaluation. Denies seeing hematology as outpatient. Last colonoscopy was >10 years ago, reported normal by patient.    On admission, pt Hb of 5.9. MCV normal. Iron studies showed low TIBC, Unsaturated binding. Iron serum of 39. Vit b12 and folate normal. Ferritin was not checked. Retic count of 3. FOBT was +. Now, the patient reports that she has the anemia for many years but has not felt symptomatic. She denies feeling lightheaded, SOB, chest pain, nausea, vomiting, abdominal pain, dysuria, hematuria, melena, and hematochezia.     Allergies:  No Known Allergies      Home Medications:    Hospital Medications:  acetaminophen   Tablet. 650 milliGRAM(s) Oral every 6 hours PRN  allopurinol 200 milliGRAM(s) Oral daily  amLODIPine   Tablet 5 milliGRAM(s) Oral daily  ascorbic acid 500 milliGRAM(s) Oral two times a day  aspirin enteric coated 81 milliGRAM(s) Oral daily  atorvastatin 10 milliGRAM(s) Oral at bedtime  carvedilol 12.5 milliGRAM(s) Oral every 12 hours  cholecalciferol 2000 Unit(s) Oral daily  docusate sodium 100 milliGRAM(s) Oral two times a day  enoxaparin Injectable 40 milliGRAM(s) SubCutaneous every 24 hours  folic acid 1 milliGRAM(s) Oral daily  influenza   Vaccine 0.5 milliLiter(s) IntraMuscular once  ketorolac 0.5% Ophthalmic Solution 1 Drop(s) Both EYES daily  lisinopril 20 milliGRAM(s) Oral daily  multivitamin 1 Tablet(s) Oral daily  oxyCODONE    5 mG/acetaminophen 325 mG 1 Tablet(s) Oral every 6 hours PRN  pantoprazole    Tablet 40 milliGRAM(s) Oral before breakfast  senna 2 Tablet(s) Oral at bedtime  sodium chloride 0.9% lock flush 3 milliLiter(s) IV Push every 8 hours      PMHX/PSHX:  AICD (automatic cardioverter/defibrillator) present  CHF (congestive heart failure)  H/O edema  Anemia  Essential hypertension, benign  History of total hip replacement      Family history:      Social History:     ROS:     General:  No wt loss, fevers, chills, night sweats, fatigue,   Eyes:  Good vision, no reported pain  ENT:  No sore throat, pain, runny nose, dysphagia  CV:  No pain, palpitations, hypo/hypertension  Resp:  No dyspnea, cough, tachypnea, wheezing  GI:  See HPI  :  No pain, bleeding, incontinence, nocturia  Muscle:  No pain, weakness  Neuro:  No weakness, tingling, memory problems  Psych:  No fatigue, insomnia, mood problems, depression  Endocrine:  No polyuria, polydipsia, cold/heat intolerance  Heme:  No petechiae, ecchymosis, easy bruisability  Skin:  No rash, edema      PHYSICAL EXAM:     GENERAL:  Appears stated age, well-groomed, well-nourished, no distress  HEENT:  NC/AT,  conjunctivae clear and pink,  no JVD  CHEST:  Full & symmetric excursion, no increased effort, breath sounds clear  HEART:  Regular rhythm, S1, S2, no murmur/rub/S3/S4, no abdominal bruit, no edema  ABDOMEN:  Soft, non-tender, non-distended, normoactive bowel sounds,  no masses ,  EXTREMITIES:  no cyanosis,clubbing or edema  SKIN:  No rash/erythema/ecchymoses/petechiae/wounds/abscess/warm/dry  NEURO:  Alert, oriented    Vital Signs:  Vital Signs Last 24 Hrs  T(C): 37 (19 Oct 2017 05:50), Max: 37.7 (18 Oct 2017 14:00)  T(F): 98.6 (19 Oct 2017 05:50), Max: 99.8 (18 Oct 2017 14:00)  HR: 98 (19 Oct 2017 06:03) (98 - 105)  BP: 150/80 (19 Oct 2017 06:03) (102/76 - 173/86)  BP(mean): 84 (18 Oct 2017 14:58) (84 - 84)  RR: 18 (19 Oct 2017 05:50) (12 - 18)  SpO2: 100% (19 Oct 2017 05:50) (100% - 100%)  Daily Height in cm: 162.56 (18 Oct 2017 18:02)    Daily     LABS:                        8.6    7.80  )-----------( 180      ( 19 Oct 2017 06:08 )             25.8     10-    139  |  103  |  17  ----------------------------<  94  3.9   |  24  |  1.09    Ca    7.7<L>      19 Oct 2017 02:28    TPro  5.7<L>  /  Alb  2.8<L>  /  TBili  1.1  /  DBili  x   /  AST  16  /  ALT  5   /  AlkPhos  144<H>  10    LIVER FUNCTIONS - ( 19 Oct 2017 02:28 )  Alb: 2.8 g/dL / Pro: 5.7 g/dL / ALK PHOS: 144 u/L / ALT: 5 u/L / AST: 16 u/L / GGT: x           PT/INR - ( 18 Oct 2017 09:15 )   PT: 10.7 SEC;   INR: 0.96            Urinalysis Basic - ( 18 Oct 2017 09:06 )    Color: YELLOW / Appearance: HAZY / S.009 / pH: 6.0  Gluc: NEGATIVE / Ketone: SMALL  / Bili: NEGATIVE / Urobili: NORMAL E.U.   Blood: TRACE / Protein: 20 / Nitrite: NEGATIVE   Leuk Esterase: LARGE / RBC: 0-2 / WBC 5-10   Sq Epi: OCC / Non Sq Epi: x / Bacteria: FEW          Imaging: Chief Complaint:  Patient is a 67y old  Female who presents with a chief complaint of spitting up blood and heart was beating fast (18 Oct 2017 19:13)      HPI:  Pt is a 66 y/o F with a PMH of chronic anemia, CKD, NICM, s/p recent hip surgery presenting to the ED after findings of low H&H by her primary care doctor.    In , she was hospitalized and worked up for her anemia. At that time, iron studies, immunofixation, skeletal survey, and bone marrow biopsy were all negative. At that time, she also reported an episode of hemoptysis but refused EGD for evaluation. Denies seeing hematology as outpatient. Last colonoscopy was >10 years ago, reported normal by patient.    On admission, pt Hb of 5.9. MCV normal. Iron studies showed low TIBC, Unsaturated binding. Iron serum of 39. Vit b12 and folate normal. Ferritin was not checked. Retic count of 3. FOBT was +. Now, the patient reports that she has the anemia for many years but has not felt symptomatic. She denies feeling lightheaded, SOB, chest pain, nausea, vomiting, abdominal pain, dysuria, hematuria, melena, and hematochezia.     Allergies:  No Known Allergies      Home Medications:    Hospital Medications:  acetaminophen   Tablet. 650 milliGRAM(s) Oral every 6 hours PRN  allopurinol 200 milliGRAM(s) Oral daily  amLODIPine   Tablet 5 milliGRAM(s) Oral daily  ascorbic acid 500 milliGRAM(s) Oral two times a day  aspirin enteric coated 81 milliGRAM(s) Oral daily  atorvastatin 10 milliGRAM(s) Oral at bedtime  carvedilol 12.5 milliGRAM(s) Oral every 12 hours  cholecalciferol 2000 Unit(s) Oral daily  docusate sodium 100 milliGRAM(s) Oral two times a day  enoxaparin Injectable 40 milliGRAM(s) SubCutaneous every 24 hours  folic acid 1 milliGRAM(s) Oral daily  influenza   Vaccine 0.5 milliLiter(s) IntraMuscular once  ketorolac 0.5% Ophthalmic Solution 1 Drop(s) Both EYES daily  lisinopril 20 milliGRAM(s) Oral daily  multivitamin 1 Tablet(s) Oral daily  oxyCODONE    5 mG/acetaminophen 325 mG 1 Tablet(s) Oral every 6 hours PRN  pantoprazole    Tablet 40 milliGRAM(s) Oral before breakfast  senna 2 Tablet(s) Oral at bedtime  sodium chloride 0.9% lock flush 3 milliLiter(s) IV Push every 8 hours      PMHX/PSHX:  AICD (automatic cardioverter/defibrillator) present  CHF (congestive heart failure)  H/O edema  Anemia  Essential hypertension, benign  History of total hip replacement      Family history:      Social History:     ROS:     General:  No wt loss, fevers, chills, night sweats, fatigue,   Eyes:  Good vision, no reported pain  ENT:  No sore throat, pain, runny nose, dysphagia  CV:  No pain, palpitations, hypo/hypertension  Resp:  No dyspnea, cough, tachypnea, wheezing  GI:  See HPI  :  No pain, bleeding, incontinence, nocturia  Muscle:  No pain, weakness  Neuro:  No weakness, tingling, memory problems  Psych:  No fatigue, insomnia, mood problems, depression  Endocrine:  No polyuria, polydipsia, cold/heat intolerance  Heme:  No petechiae, ecchymosis, easy bruisability  Skin:  No rash, edema      PHYSICAL EXAM:     GENERAL:  Appears stated age, well-groomed, well-nourished, no distress  HEENT:  NC/AT,  conjunctivae clear and pink,  no JVD  CHEST:  Full & symmetric excursion, no increased effort, breath sounds clear  HEART:  Regular rhythm, S1, S2, no murmur/rub/S3/S4, no abdominal bruit, no edema  ABDOMEN:  Soft, non-tender, non-distended, normoactive bowel sounds,  no masses ,  EXTREMITIES:  Left hip s/p hip surgery, swollen painful. Bandage with evidence of bleeding  SKIN:  No rash/erythema/ecchymoses/petechiae/wounds/abscess/warm/dry  NEURO:  Alert, oriented    Vital Signs:  Vital Signs Last 24 Hrs  T(C): 37 (19 Oct 2017 05:50), Max: 37.7 (18 Oct 2017 14:00)  T(F): 98.6 (19 Oct 2017 05:50), Max: 99.8 (18 Oct 2017 14:00)  HR: 98 (19 Oct 2017 06:03) (98 - 105)  BP: 150/80 (19 Oct 2017 06:03) (102/76 - 173/86)  BP(mean): 84 (18 Oct 2017 14:58) (84 - 84)  RR: 18 (19 Oct 2017 05:50) (12 - 18)  SpO2: 100% (19 Oct 2017 05:50) (100% - 100%)  Daily Height in cm: 162.56 (18 Oct 2017 18:02)    Daily     LABS:                        8.6    7.80  )-----------( 180      ( 19 Oct 2017 06:08 )             25.8     10-19    139  |  103  |  17  ----------------------------<  94  3.9   |  24  |  1.09    Ca    7.7<L>      19 Oct 2017 02:28    TPro  5.7<L>  /  Alb  2.8<L>  /  TBili  1.1  /  DBili  x   /  AST  16  /  ALT  5   /  AlkPhos  144<H>  10-19    LIVER FUNCTIONS - ( 19 Oct 2017 02:28 )  Alb: 2.8 g/dL / Pro: 5.7 g/dL / ALK PHOS: 144 u/L / ALT: 5 u/L / AST: 16 u/L / GGT: x           PT/INR - ( 18 Oct 2017 09:15 )   PT: 10.7 SEC;   INR: 0.96            Urinalysis Basic - ( 18 Oct 2017 09:06 )    Color: YELLOW / Appearance: HAZY / S.009 / pH: 6.0  Gluc: NEGATIVE / Ketone: SMALL  / Bili: NEGATIVE / Urobili: NORMAL E.U.   Blood: TRACE / Protein: 20 / Nitrite: NEGATIVE   Leuk Esterase: LARGE / RBC: 0-2 / WBC 5-10   Sq Epi: OCC / Non Sq Epi: x / Bacteria: FEW          Imaging:

## 2017-10-19 NOTE — CONSULT NOTE ADULT - ASSESSMENT
1) Anemia-normocytic  Ddx: anemia of chronic disease vs. blood loss (GI?: angiodysplasia vs. ectasias vs. PUD vs. gastritis vs. esophagitis vs. polyp vs gi malignancy)  Her iron studies most consistent with anemia of chronic disease. She has had no signs of overt GI bleed but FOBT positive.    -recommend hematology consult  -check ferritin level  -will plan for EGD/colonoscopy tomorrow to evaluate for possible GI cause of anemia  -keep clears today and NPO after midnight  -we will order prep 1) Anemia-normocytic  Ddx: anemia of chronic disease vs. blood loss (GI?: angiodysplasia vs. ectasias vs. PUD vs. gastritis vs. esophagitis vs. polyp vs gi malignancy)  Her iron studies most consistent with anemia of chronic disease. She has had no signs of overt GI bleed but FOBT positive.    -recommend hematology consult  -recommend ortho consult to evaluate for hip joint bleed  -check ferritin level  -will hold off on EGD and colonscopy given recent hip surgery. She can get endoscopy done as an outpatient.  -please call back with any additional questions 1) Anemia-normocytic  Ddx: anemia of chronic disease vs. blood loss (GI?: angiodysplasia vs. ectasias vs. PUD vs. gastritis vs. esophagitis vs. polyp vs gi malignancy)  Her iron studies most consistent with anemia of chronic disease. She has had no signs of overt GI bleed but FOBT positive.    -recommend hematology consult  -recommend ortho consult to evaluate for hip joint bleed  -check ferritin level  -will hold off on EGD and colonscopy given recent hip surgery (she would not be able to tolerate laying on her side for the procedure. This is not an emergent case and she can get EGD and colo done as an outpatient.  -please call back with any additional questions

## 2017-10-19 NOTE — MEDICAL STUDENT ADULT H&P (EDUCATION) - NS MD HP STUD RESULTS LAB FT
WBC 8.28; Hg 5.2; Hct 16.1; Plt 167; MCV 92.0; Occult Blood+;   Na 137; K 4.1; Cl 99; CO2 23; BUN 16; Cr 0.92; Glu 96    PT 10.7; INR .96  B12 > 2000; Folate > 20; Haptoglobin 277; ; Alk Phos 147;  Total Iron 39; TIBC 133; TSH 2.69;     UA: Trace blood, Nitrite neg, Large leukocyte esterase, RBC 0-2; WBC 5-10; Few Bacteria

## 2017-10-19 NOTE — DISCHARGE NOTE ADULT - MEDICATION SUMMARY - MEDICATIONS TO TAKE
I will START or STAY ON the medications listed below when I get home from the hospital:    Aspirin Enteric Coated 81 mg oral delayed release tablet  -- 1 tab(s) by mouth once a day  -- Indication: For S/P hip replacement    oxyCODONE-acetaminophen 5 mg-325 mg oral tablet  -- 1 tab(s) by mouth every 6 hours, As needed, mod-sever pain  -- Indication: For S/P hip replacement    Zestril 20 mg oral tablet  -- 1 tab(s) by mouth once a day  -- Indication: For Essential hypertension, benign    allopurinol 100 mg oral tablet  -- 2 tab(s) by mouth once a day  -- Indication: For Gout    pravastatin 10 mg oral tablet  -- 1 tab(s) by mouth once a day (at bedtime)  -- Indication: For CAD (coronary artery disease)    carvedilol 12.5 mg oral tablet  -- 1 tab(s) by mouth 2 times a day  -- Indication: For Essential hypertension, benign    amLODIPine 5 mg oral tablet  -- 1 tab(s) by mouth once a day  -- Indication: For Essential hypertension, benign    ferrous sulfate 325 mg (65 mg elemental iron) oral tablet  -- 1 tab(s) by mouth 3 times a day  -- Indication: For Anemia    Colace 100 mg oral capsule  -- 1 cap(s) by mouth 2 times a day  -- Indication: For Constipation    senna 8.6 mg oral tablet  -- 2 tab(s) by mouth once a day (at bedtime)  -- Indication: For Constipation    ketorolac 0.5% ophthalmic solution  -- 1 drop(s) to both affected eye  -- Indication: For Glaucoma    Protonix 40 mg oral delayed release tablet  -- 1 tab(s) by mouth once a day  -- Indication: For GERD    Multiple Vitamins oral capsule  -- 1 cap(s) by mouth once a day  -- Indication: For health maintenance    Vitamin C 500 mg oral tablet  -- 1 tab(s) by mouth 2 times a day  -- Indication: For health maintenance    Vitamin D3 2000 intl units oral tablet  -- 1 tab(s) by mouth once a day  -- Indication: For health maintenance I will START or STAY ON the medications listed below when I get home from the hospital:    Aspirin Enteric Coated 81 mg oral delayed release tablet  -- 1 tab(s) by mouth once a day  -- Indication: For S/P hip replacement    oxyCODONE-acetaminophen 5 mg-325 mg oral tablet  -- 1 tab(s) by mouth every 6 hours, As needed, mod-sever pain  -- Indication: For S/P hip replacement    Zestril 20 mg oral tablet  -- 1 tab(s) by mouth once a day  -- Indication: For Essential hypertension, benign    Lovenox 40 mg/0.4 mL injectable solution  -- 40 milligram(s) injectable once a day  -- Indication: For dvt prophylaxis    allopurinol 100 mg oral tablet  -- 2 tab(s) by mouth once a day  -- Indication: For Gout    pravastatin 10 mg oral tablet  -- 1 tab(s) by mouth once a day (at bedtime)  -- Indication: For CAD (coronary artery disease)    carvedilol 12.5 mg oral tablet  -- 1 tab(s) by mouth 2 times a day  -- Indication: For Essential hypertension, benign    amLODIPine 5 mg oral tablet  -- 1 tab(s) by mouth once a day  -- Indication: For Essential hypertension, benign    ferrous sulfate 325 mg (65 mg elemental iron) oral tablet  -- 1 tab(s) by mouth 3 times a day  -- Indication: For Anemia    Colace 100 mg oral capsule  -- 1 cap(s) by mouth 2 times a day  -- Indication: For Constipation    senna 8.6 mg oral tablet  -- 2 tab(s) by mouth once a day (at bedtime)  -- Indication: For Constipation    ketorolac 0.5% ophthalmic solution  -- 1 drop(s) to both affected eye  -- Indication: For Glaucoma    Protonix 40 mg oral delayed release tablet  -- 1 tab(s) by mouth once a day  -- Indication: For GERD    Multiple Vitamins oral capsule  -- 1 cap(s) by mouth once a day  -- Indication: For health maintenance    Vitamin C 500 mg oral tablet  -- 1 tab(s) by mouth 2 times a day  -- Indication: For health maintenance    Vitamin D3 2000 intl units oral tablet  -- 1 tab(s) by mouth once a day  -- Indication: For health maintenance

## 2017-10-19 NOTE — DISCHARGE NOTE ADULT - HOSPITAL COURSE
67 F with chronic anemia (neg bone marrow bx), HTN, HLD, gout, chronic systolic CHF w AICD, p/w worsening anemia s/p hip surgery 1 week ago- likely post op bleeding.        Anemia  -Stool occult + on this admission, prior bone marrow Bx non-diagnostic  -Pt already on Iron therapy  -Pt s/p 2 unit PRBC for H/H 5.2/16.1  -GI consulted,  but pt prefers outpt w/u, Iron studies indicates chronic anemia  -H/H remained stable    CHF  Pt clinically compensated, on Ace-i but no diuretics, low salt diet  LLE edema since surgery per pt- ruled out for DVT.       CAD  -continued on ASA, BB, statin.     Gout  -continue allopurinol.       S/P hip replacement  -pain control, X-ray with normal post-op findings.       Prophylactic measure. Plan: noted on both heparin and Lovenox PPX at [facility]. Pt resumed on Lovenox,  no overt bleed and high risk of DVT in setting of recent hip replacement. 67 F with chronic anemia (neg bone marrow bx), HTN, HLD, gout, chronic systolic CHF w AICD, p/w worsening anemia s/p hip surgery 1 week ago- likely post op bleeding.        Anemia  -Stool occult + on this admission, prior bone marrow Bx non-diagnostic  -Pt already on Iron therapy  -Pt s/p 2 unit PRBC for H/H 5.2/16.1  -GI consulted,  but pt prefers outpt w/u, Iron studies indicates chronic anemia  -H/H remained stable    CHF  Pt clinically compensated, on Ace-i but no diuretics, low salt diet  LLE edema since surgery per pt- ruled out for DVT.       CAD  -continued on ASA, BB, statin.     Gout  -continue allopurinol.       S/P left hip replacement  -pain control, X-ray with normal post-op findings.   -Left hip dsg dry and Intact  -Pt to f/u Outpt with Orthopedics at [facility] Dr. Locke for postop care and further management       Prophylactic measure. Plan: noted on both heparin and Lovenox PPX at [facility]. Pt resumed on Lovenox,  no overt bleed and high risk of DVT in setting of recent hip replacement. 67 F with chronic anemia (neg bone marrow bx), HTN, HLD, gout, chronic systolic CHF w AICD, p/w worsening anemia s/p hip surgery 1 week ago- likely post op bleeding.        Anemia  -Stool occult + on this admission, prior bone marrow Bx non-diagnostic  -Pt already on Iron therapy  -Pt s/p 2 unit PRBC for H/H 5.2/16.1  -GI consulted,  but pt prefers outpt w/u, Iron studies indicates chronic anemia  -H/H remained stable  spoke w/ Dr. Locke at [facility]- felt that drop in hgb was due to surgery.  pt remained hemodynamically stable.      CHF  Pt clinically compensated, on Ace-i but no diuretics, low salt diet  LLE edema since surgery per pt- ruled out for DVT.       CAD  -continued on ASA, BB, statin.     Gout  -continue allopurinol.       S/P left hip replacement  -pain control, X-ray with normal post-op findings.   -Left hip dsg dry and Intact  -Pt to f/u Outpt with Orthopedics at [facility] Dr. Locke for postop care and further management   NON WEIGHT BEARING LLE FOR 6 WEEKS FROM SURGERY      Prophylactic measure. Lovenox PPX at [facility]. Pt resumed on Lovenox,  no overt bleed and high risk of DVT in setting of recent hip replacement.

## 2017-10-19 NOTE — MEDICAL STUDENT ADULT H&P (EDUCATION) - NS MD HP STUD ASPLAN ASSES FT
Pt is a 66 y/o F with a PMH of chronic anemia, CHF, NICM, HTN, and HLD p/w normocytic anemia secondary to pathology in RBC production (chronic renal insufficiency, anemia of chronic disease, myelodysplatic syndrome) vs RBC destruction (AIHA) vs chronic blood loss 2/2 to malignancy.     Given the asymptomatic nature of her anemia at a Hg of 5.2, it is likely that this process is chronic in nature. The normocytic nature of the anemia, as well as the non-hemolytic lab findings (Hapto 277; ; Tbili 1.1), normal renal function (BUN 17 and Cr 1.09), and normal B12/folate levels, with a positive fecal occult blood test is suggestive of chronic blood loss.

## 2017-10-19 NOTE — PROGRESS NOTE ADULT - PROBLEM SELECTOR PLAN 1
occult + on this admission, prior bone marrow Bx non-diagnostic, already on iron and FA therapy. no overt bleed. for 2 units PRBC now.   GI saw pt- but pt does not want inpatient work up  hold iron for now.   if am cbc stable- dc to rehab- pt wants to walk occult + on this admission, prior bone marrow Bx non-diagnostic, already on iron and FA therapy. no overt bleed. for 2 units PRBC now.   GI saw pt- but pt prefers outpt w/u  hold iron for now.   if am cbc stable- dc to rehab in am- pt wants to walk  left message for Dr. Locke of ortho at [facility]([phone])

## 2017-10-19 NOTE — MEDICAL STUDENT ADULT H&P (EDUCATION) - NS MD HP STUD ASPLAN PLAN FT
1. Anemia secondary to chronic blood loss  -Hgb 5.2; Hct 16.1; MCV 92 on admission, patient was asymptomatic and denied SOB, chest pain, lightheadedness, dizziness.  -Tbili WNL, Hapto 277; , no jaundice/pruritis  -B12/Folate WNL  -Positive fecal occult blood test  -Prior colonoscopy was > 10 years ago, pt does not recall report  -Suggest colonoscopy to screen for malignancy/source of bleeding  -Consider BM bx   -Trend CBC and monitor for transfusion requirements (Hgb < 7.0 if afebrile, < 8.0 if febrile)    2. Diet - Clear liquids in preparation for possible colonoscopy, NPO if patient agrees to procedure.     3. DVT prrophylaxis - Moderate risk for falls with recent hip replacement surgery.   -Enoxaparin (Renal function intact)

## 2017-10-19 NOTE — CONSULT NOTE ADULT - ATTENDING COMMENTS
Patient seen/examined. Requested to evaluate patient for anemia. Patient reported to have history of chronic anemia. Of note, patient status post THR at [facility] one week ago. Apparently required transfusion at that time. Patient had CBC with hemoglobin approximately 5 g prompting current hospitalization. Patient denies any complaints of dysphagia, nausea, vomiting, hematemesis, abdominal pain, diarrhea, constipation, melena or hematochezia.    Patient is comfortable/NAD/nontoxic-appearing. Abdomen is soft, nontender, nondistended and without mass or hepatosplenomegaly. Dressings noted at left hip with associated swelling and some tenderness. Limited mobility. Dressing blood stained.    Lab evaluation as noted above.    Impression:  -Anemia: Per history this is chronic and long-standing. Significant increased anemia over baseline reported associated with left THR performed one week ago. Operative intervention with associated blood loss no doubt contributory. No current GI symptoms. Overt GI bleeding not reported. Stool for occult blood detected but quite nonspecific in the setting of recent surgery only one week ago.    Recommendation:  -Serial hemoglobin and hematocrit.  -Pantoprazole 40 mg daily for now.  -Orthopedic followup.  -Avoid NSAIDs if feasible.  -In view of major hip surgery one week ago and patient's limited ability to ambulate and turn and in view of absence of overt GI bleeding will defer endoscopic evaluation at current time. Advise elective colonoscopy and upper endoscopy after further rehabilitation from left THR.

## 2017-10-19 NOTE — MEDICAL STUDENT ADULT H&P (EDUCATION) - NS MD HP STUD HX OF PRESENT ILLNESS FT
Pt is a 66 y/o F with a PMH of chronic anemia, CKD, NICM, and CHF presenting to the ED after findings of low H&H by her primary care doctor. In 2014, she was hospitalzied and worked up for her anemia. At that time, iron studies, immunofixation, skeletal survey, and bone marrow biopsy were all negative. At that time, she also reported an episode of hemoptysis but refused EGD for evaluation. Now, the patient reports that she has the anemia for many years but has not felt symptomatic. She denies feeling lightheaded, dyspnic, chest pain, nausea, vomiting, abdominal pain, dysuria, hematuria, melena, and hematochezia.

## 2017-10-19 NOTE — DISCHARGE NOTE ADULT - CARE PLAN
Principal Discharge DX:	Anemia Principal Discharge DX:	Anemia  Goal:	Remain stable, no active bleeding  Instructions for follow-up, activity and diet:	follow up with PCP at Rehab facility  Secondary Diagnosis:	S/P hip replacement  Instructions for follow-up, activity and diet:	Non-weight bearing to LLE x 6 weeks  Secondary Diagnosis:	Essential hypertension, benign  Goal:	SBP<140  Instructions for follow-up, activity and diet:	continue with blood pressure medications as ordered  Secondary Diagnosis:	Gout  Goal:	without flares  Instructions for follow-up, activity and diet:	continue with allopurinol  Secondary Diagnosis:	Chronic systolic congestive heart failure  Goal:	remain stable  Instructions for follow-up, activity and diet:	continue with Coreg Principal Discharge DX:	Anemia  Goal:	Remain stable, no active bleeding  Instructions for follow-up, activity and diet:	follow up with PCP at Rehab facility  Secondary Diagnosis:	S/P hip replacement  Instructions for follow-up, activity and diet:	Non-weight bearing to LLE x 6 weeks.  continue with Physical therapy at Rehab facility  follow up with Dr. Locke, maintain schedule appointment 0n 10/27  Secondary Diagnosis:	Essential hypertension, benign  Goal:	SBP<140  Instructions for follow-up, activity and diet:	continue with blood pressure medications as ordered  Secondary Diagnosis:	Gout  Goal:	without flares  Instructions for follow-up, activity and diet:	continue with allopurinol  Secondary Diagnosis:	Chronic systolic congestive heart failure  Goal:	remain stable  Instructions for follow-up, activity and diet:	continue with Coreg Principal Discharge DX:	Anemia  Goal:	Remain stable, no active bleeding  Instructions for follow-up, activity and diet:	follow up with PCP at Rehab facility; you can see Gastroenterologist of your choice after discharge from rehab re: your colonoscopy  Secondary Diagnosis:	S/P hip replacement  Instructions for follow-up, activity and diet:	Non-weight bearing to LLE x 6 weeks.  continue with Physical therapy at Rehab facility  follow up with Dr. Locke, maintain schedule appointment 0n 10/27  Secondary Diagnosis:	Essential hypertension, benign  Goal:	SBP<140  Instructions for follow-up, activity and diet:	continue with blood pressure medications as ordered  Secondary Diagnosis:	Gout  Goal:	without flares  Instructions for follow-up, activity and diet:	continue with allopurinol  Secondary Diagnosis:	Chronic systolic congestive heart failure  Goal:	remain stable  Instructions for follow-up, activity and diet:	continue with Coreg

## 2017-10-19 NOTE — DISCHARGE NOTE ADULT - PLAN OF CARE
Remain stable, no active bleeding follow up with PCP at Rehab facility Non-weight bearing to LLE x 6 weeks SBP<140 continue with blood pressure medications as ordered without flares continue with allopurinol remain stable continue with Coreg Non-weight bearing to LLE x 6 weeks.  continue with Physical therapy at Rehab facility  follow up with Dr. Locke, maintain schedule appointment 0n 10/27 follow up with PCP at Rehab facility; you can see Gastroenterologist of your choice after discharge from rehab re: your colonoscopy

## 2017-10-19 NOTE — MEDICAL STUDENT ADULT H&P (EDUCATION) - NS MD HP STUD FAM HX FT
3 Sisters with Anemia (unspecified type) and DM (unspecified type)  Daughter - Anemia (unspecified type)  No known family history of GI ca.

## 2017-10-19 NOTE — MEDICAL STUDENT ADULT H&P (EDUCATION) - NS MD HP STUD RESULTS RAD FT
US Duplex Lower Ext: No sign of DVT bilaterally    CXR AP INTERPRETATION:   Left-sided single-lead AICD is present. The lungs are clear, heart is not enlarged and no effusion or pneumothorax.    XRay Hip w/Pelvis:   Bilateral total hip prostheses present, cementless with acetabular screw fixation on the right and cementless on the left with cup cage acetabular reconstruction components and with 2 proximal femoral cerclage wires also securing a proximal medial onlay bone allograft. No periprosthetic fractures or suspicious periprosthetic lucencies.    Surgical skin staples overlie the left hip incision site. Small stippled air collections in the deep upper medial left thigh soft tissues thought related to residual postsurgical change rather than infection, however correlate clinically.    Intact pelvic and obturator rings.    Generalized osteopenia otherwise no discrete lytic or blastic lesions.    Vascular calcifications noted. Bulky lobular heterogeneously calcified pelvic mass densities likely uterine fibroid related.

## 2017-10-19 NOTE — DISCHARGE NOTE ADULT - PATIENT PORTAL LINK FT
“You can access the Follow[employer] Patient Portal, offered by [facility], by registering with the following website: [email or web address]”

## 2017-10-19 NOTE — MEDICAL STUDENT ADULT H&P (EDUCATION) - NS MD HP STUD MEDICATIONS FT
Zestril 20mg PO QD  Carvedilol 12.5mg PO BID  Amlodipine 5mg PO QD  ASA 81mg QD  Allopurinol 100mg BID  Pravastatin 10mg QD  Folic Acid 1mg QD

## 2017-10-19 NOTE — PROGRESS NOTE ADULT - PROBLEM SELECTOR PLAN 2
clinically compensated, on Ace-i but no diuretics. diet control. clinically compensated, on Ace-i but no diuretics. diet control.  LLE edema since surgery per pt- ruled out for DVT

## 2017-10-19 NOTE — DISCHARGE NOTE ADULT - PROVIDER TOKENS
FREE:[LAST:[Dr. Locke-[facility]],PHONE:[phone]],FAX:[(   )    -]] FREE:[LAST[facility]PHONE:[phone]],FAX:[(   )    -]]

## 2017-10-20 LAB
ANISOCYTOSIS BLD QL: SLIGHT — SIGNIFICANT CHANGE UP
BASOPHILS # BLD AUTO: 0.02 K/UL — SIGNIFICANT CHANGE UP (ref 0–0.2)
BASOPHILS NFR BLD AUTO: 0.3 % — SIGNIFICANT CHANGE UP (ref 0–2)
BASOPHILS NFR SPEC: 0 % — SIGNIFICANT CHANGE UP (ref 0–2)
BUN SERPL-MCNC: 11 MG/DL — SIGNIFICANT CHANGE UP (ref 7–23)
CALCIUM SERPL-MCNC: 8.2 MG/DL — LOW (ref 8.4–10.5)
CHLORIDE SERPL-SCNC: 103 MMOL/L — SIGNIFICANT CHANGE UP (ref 98–107)
CO2 SERPL-SCNC: 25 MMOL/L — SIGNIFICANT CHANGE UP (ref 22–31)
CREAT SERPL-MCNC: 0.89 MG/DL — SIGNIFICANT CHANGE UP (ref 0.5–1.3)
EOSINOPHIL # BLD AUTO: 0.13 K/UL — SIGNIFICANT CHANGE UP (ref 0–0.5)
EOSINOPHIL NFR BLD AUTO: 1.8 % — SIGNIFICANT CHANGE UP (ref 0–6)
EOSINOPHIL NFR FLD: 2.6 % — SIGNIFICANT CHANGE UP (ref 0–6)
GIANT PLATELETS BLD QL SMEAR: PRESENT — SIGNIFICANT CHANGE UP
GLUCOSE SERPL-MCNC: 92 MG/DL — SIGNIFICANT CHANGE UP (ref 70–99)
HCT VFR BLD CALC: 24.5 % — LOW (ref 34.5–45)
HGB BLD-MCNC: 8.2 G/DL — LOW (ref 11.5–15.5)
IMM GRANULOCYTES # BLD AUTO: 0.41 # — SIGNIFICANT CHANGE UP
IMM GRANULOCYTES NFR BLD AUTO: 5.8 % — HIGH (ref 0–1.5)
LYMPHOCYTES # BLD AUTO: 1.49 K/UL — SIGNIFICANT CHANGE UP (ref 1–3.3)
LYMPHOCYTES # BLD AUTO: 20.9 % — SIGNIFICANT CHANGE UP (ref 13–44)
LYMPHOCYTES NFR SPEC AUTO: 6.1 % — LOW (ref 13–44)
MACROCYTES BLD QL: SIGNIFICANT CHANGE UP
MCHC RBC-ENTMCNC: 29.9 PG — SIGNIFICANT CHANGE UP (ref 27–34)
MCHC RBC-ENTMCNC: 33.5 % — SIGNIFICANT CHANGE UP (ref 32–36)
MCV RBC AUTO: 89.4 FL — SIGNIFICANT CHANGE UP (ref 80–100)
MICROCYTES BLD QL: SLIGHT — SIGNIFICANT CHANGE UP
MONOCYTES # BLD AUTO: 0.59 K/UL — SIGNIFICANT CHANGE UP (ref 0–0.9)
MONOCYTES NFR BLD AUTO: 8.3 % — SIGNIFICANT CHANGE UP (ref 2–14)
MONOCYTES NFR BLD: 2.6 % — SIGNIFICANT CHANGE UP (ref 2–9)
MYELOCYTES NFR BLD: 0.9 % — HIGH (ref 0–0)
NEUTROPHIL AB SER-ACNC: 80 % — HIGH (ref 43–77)
NEUTROPHILS # BLD AUTO: 4.48 K/UL — SIGNIFICANT CHANGE UP (ref 1.8–7.4)
NEUTROPHILS NFR BLD AUTO: 62.9 % — SIGNIFICANT CHANGE UP (ref 43–77)
NEUTS BAND # BLD: 2.6 % — SIGNIFICANT CHANGE UP (ref 0–6)
NRBC # FLD: 0.03 — SIGNIFICANT CHANGE UP
PLATELET # BLD AUTO: 199 K/UL — SIGNIFICANT CHANGE UP (ref 150–400)
PLATELET COUNT - ESTIMATE: NORMAL — SIGNIFICANT CHANGE UP
PMV BLD: 10.7 FL — SIGNIFICANT CHANGE UP (ref 7–13)
POLYCHROMASIA BLD QL SMEAR: SIGNIFICANT CHANGE UP
POTASSIUM SERPL-MCNC: 4 MMOL/L — SIGNIFICANT CHANGE UP (ref 3.5–5.3)
POTASSIUM SERPL-SCNC: 4 MMOL/L — SIGNIFICANT CHANGE UP (ref 3.5–5.3)
RBC # BLD: 2.74 M/UL — LOW (ref 3.8–5.2)
RBC # FLD: 15.9 % — HIGH (ref 10.3–14.5)
SODIUM SERPL-SCNC: 140 MMOL/L — SIGNIFICANT CHANGE UP (ref 135–145)
VARIANT LYMPHS # BLD: 5.2 % — SIGNIFICANT CHANGE UP
WBC # BLD: 7.12 K/UL — SIGNIFICANT CHANGE UP (ref 3.8–10.5)
WBC # FLD AUTO: 7.12 K/UL — SIGNIFICANT CHANGE UP (ref 3.8–10.5)

## 2017-10-20 PROCEDURE — 99233 SBSQ HOSP IP/OBS HIGH 50: CPT

## 2017-10-20 RX ADMIN — CARVEDILOL PHOSPHATE 12.5 MILLIGRAM(S): 80 CAPSULE, EXTENDED RELEASE ORAL at 17:41

## 2017-10-20 RX ADMIN — Medication 100 MILLIGRAM(S): at 06:13

## 2017-10-20 RX ADMIN — Medication 81 MILLIGRAM(S): at 13:01

## 2017-10-20 RX ADMIN — CARVEDILOL PHOSPHATE 12.5 MILLIGRAM(S): 80 CAPSULE, EXTENDED RELEASE ORAL at 06:13

## 2017-10-20 RX ADMIN — OXYCODONE AND ACETAMINOPHEN 1 TABLET(S): 5; 325 TABLET ORAL at 13:52

## 2017-10-20 RX ADMIN — LISINOPRIL 20 MILLIGRAM(S): 2.5 TABLET ORAL at 06:21

## 2017-10-20 RX ADMIN — Medication 1 TABLET(S): at 13:01

## 2017-10-20 RX ADMIN — SODIUM CHLORIDE 3 MILLILITER(S): 9 INJECTION INTRAMUSCULAR; INTRAVENOUS; SUBCUTANEOUS at 06:12

## 2017-10-20 RX ADMIN — Medication 1 MILLIGRAM(S): at 12:47

## 2017-10-20 RX ADMIN — ATORVASTATIN CALCIUM 10 MILLIGRAM(S): 80 TABLET, FILM COATED ORAL at 22:26

## 2017-10-20 RX ADMIN — OXYCODONE AND ACETAMINOPHEN 1 TABLET(S): 5; 325 TABLET ORAL at 14:45

## 2017-10-20 RX ADMIN — Medication 500 MILLIGRAM(S): at 06:14

## 2017-10-20 RX ADMIN — Medication 1 DROP(S): at 12:48

## 2017-10-20 RX ADMIN — SODIUM CHLORIDE 3 MILLILITER(S): 9 INJECTION INTRAMUSCULAR; INTRAVENOUS; SUBCUTANEOUS at 14:43

## 2017-10-20 RX ADMIN — SODIUM CHLORIDE 3 MILLILITER(S): 9 INJECTION INTRAMUSCULAR; INTRAVENOUS; SUBCUTANEOUS at 22:21

## 2017-10-20 RX ADMIN — PANTOPRAZOLE SODIUM 40 MILLIGRAM(S): 20 TABLET, DELAYED RELEASE ORAL at 06:13

## 2017-10-20 RX ADMIN — Medication 2000 UNIT(S): at 17:41

## 2017-10-20 RX ADMIN — ENOXAPARIN SODIUM 40 MILLIGRAM(S): 100 INJECTION SUBCUTANEOUS at 22:26

## 2017-10-20 RX ADMIN — Medication 500 MILLIGRAM(S): at 17:41

## 2017-10-20 RX ADMIN — Medication 200 MILLIGRAM(S): at 12:46

## 2017-10-20 RX ADMIN — AMLODIPINE BESYLATE 5 MILLIGRAM(S): 2.5 TABLET ORAL at 06:13

## 2017-10-20 NOTE — PROGRESS NOTE ADULT - PROBLEM SELECTOR PLAN 1
Patient Hb stabilized over last 4 CBCs. No clinical signs of bleeding at this time. As per prior GI consult, patient can do outpatient workup for (+) FOBT.   Patient medically ready for discharge after discussion with Dr. Osborn and patient, particularly in lieu of Dr. Osborn's discussion with patient's orthopedic surgeon Dr. Locke - where likely patient developed post-operative bleeding.   Pending PT clearance, patient [facility] to [facility] to [facility]

## 2017-10-20 NOTE — PROGRESS NOTE ADULT - PROBLEM SELECTOR PLAN 2
Clinically compensated, on Ace-i but no diuretics. diet control.  LLE edema since surgery per pt- ruled out for DVT

## 2017-10-20 NOTE — PROGRESS NOTE ADULT - SUBJECTIVE AND OBJECTIVE BOX
CC: Anemia    SUBJECTIVE / OVERNIGHT EVENTS:   No complaints feels fine.    Denies headache, weakness, malaise, fevers, chills, visual changes, chest pain, dyspnea, cough, abdominal pain, nausea, vomiting, diarrhea, constipation, dysuria, hematuria, polyuria, pruritis, joint pain    MEDICATIONS  (STANDING):  allopurinol 200 milliGRAM(s) Oral daily  amLODIPine   Tablet 5 milliGRAM(s) Oral daily  ascorbic acid 500 milliGRAM(s) Oral two times a day  aspirin enteric coated 81 milliGRAM(s) Oral daily  atorvastatin 10 milliGRAM(s) Oral at bedtime  carvedilol 12.5 milliGRAM(s) Oral every 12 hours  cholecalciferol 2000 Unit(s) Oral daily  docusate sodium 100 milliGRAM(s) Oral two times a day  enoxaparin Injectable 40 milliGRAM(s) SubCutaneous every 24 hours  folic acid 1 milliGRAM(s) Oral daily  influenza   Vaccine 0.5 milliLiter(s) IntraMuscular once  ketorolac 0.5% Ophthalmic Solution 1 Drop(s) Both EYES daily  lisinopril 20 milliGRAM(s) Oral daily  multivitamin 1 Tablet(s) Oral daily  pantoprazole    Tablet 40 milliGRAM(s) Oral before breakfast  senna 2 Tablet(s) Oral at bedtime  sodium chloride 0.9% lock flush 3 milliLiter(s) IV Push every 8 hours    MEDICATIONS  (PRN):  acetaminophen   Tablet. 650 milliGRAM(s) Oral every 6 hours PRN Mild Pain (1 - 3)  oxyCODONE    5 mG/acetaminophen 325 mG 1 Tablet(s) Oral every 6 hours PRN mod-sever pain      Vital Signs Last 24 Hrs  T(C): 37.1 (20 Oct 2017 05:44), Max: 37.4 (19 Oct 2017 15:15)  T(F): 98.7 (20 Oct 2017 05:44), Max: 99.3 (19 Oct 2017 15:15)  HR: 88 (20 Oct 2017 05:44) (88 - 92)  BP: 145/78 (20 Oct 2017 05:44) (134/70 - 145/78)  BP(mean): --  RR: 18 (20 Oct 2017 05:44) (18 - 18)  SpO2: 100% (20 Oct 2017 05:44) (100% - 100%)  CAPILLARY BLOOD GLUCOSE    PHYSICAL EXAM:  GENERAL: NAD, well-developed  HEAD:  Atraumatic, Normocephalic  EYES: EOMI, PERRLA, conjunctiva and sclera clear  NECK: Supple, No JVD  CHEST/LUNG: Clear to auscultation bilaterally; No wheeze  HEART: Regular rate and rhythm; No murmurs, rubs, or gallops  ABDOMEN: Soft, Nontender, Nondistended; Bowel sounds present  EXTREMITIES:  2+ Peripheral Pulses, No clubbing, cyanosis, or edema, (+) Left hip dressing at surgical site, with dried blood. No weeping through bandage   PSYCH: AAOx3  NEUROLOGY: non-focal  SKIN: No rashes or lesions    LABS:                        8.2    7.12  )-----------( 199      ( 20 Oct 2017 06:00 )             24.5     10-20    140  |  103  |  11  ----------------------------<  92  4.0   |  25  |  0.89    Ca    8.2<L>      20 Oct 2017 06:00    TPro  5.7<L>  /  Alb  2.8<L>  /  TBili  1.1  /  DBili  x   /  AST  16  /  ALT  5   /  AlkPhos  144<H>  10-19        RADIOLOGY & ADDITIONAL TESTS:    Imaging Personally Reviewed:    Consultant(s) Notes Reviewed:      Care Discussed with Consultants/Other Providers:

## 2017-10-20 NOTE — PHYSICAL THERAPY INITIAL EVALUATION ADULT - ACTIVE RANGE OF MOTION EXAMINATION, REHAB EVAL
bilateral upper extremity Active ROM was WFL (within functional limits)/ankle wfl/Right LE Active ROM was WFL (within functional limits)

## 2017-10-21 PROCEDURE — 99233 SBSQ HOSP IP/OBS HIGH 50: CPT

## 2017-10-21 RX ADMIN — Medication 1 DROP(S): at 12:47

## 2017-10-21 RX ADMIN — PANTOPRAZOLE SODIUM 40 MILLIGRAM(S): 20 TABLET, DELAYED RELEASE ORAL at 05:59

## 2017-10-21 RX ADMIN — CARVEDILOL PHOSPHATE 12.5 MILLIGRAM(S): 80 CAPSULE, EXTENDED RELEASE ORAL at 05:58

## 2017-10-21 RX ADMIN — Medication 650 MILLIGRAM(S): at 23:19

## 2017-10-21 RX ADMIN — CARVEDILOL PHOSPHATE 12.5 MILLIGRAM(S): 80 CAPSULE, EXTENDED RELEASE ORAL at 18:11

## 2017-10-21 RX ADMIN — SODIUM CHLORIDE 3 MILLILITER(S): 9 INJECTION INTRAMUSCULAR; INTRAVENOUS; SUBCUTANEOUS at 05:58

## 2017-10-21 RX ADMIN — Medication 650 MILLIGRAM(S): at 22:49

## 2017-10-21 RX ADMIN — Medication 500 MILLIGRAM(S): at 18:11

## 2017-10-21 RX ADMIN — AMLODIPINE BESYLATE 5 MILLIGRAM(S): 2.5 TABLET ORAL at 05:58

## 2017-10-21 RX ADMIN — Medication 200 MILLIGRAM(S): at 12:45

## 2017-10-21 RX ADMIN — Medication 1 TABLET(S): at 12:45

## 2017-10-21 RX ADMIN — Medication 1 MILLIGRAM(S): at 12:44

## 2017-10-21 RX ADMIN — ENOXAPARIN SODIUM 40 MILLIGRAM(S): 100 INJECTION SUBCUTANEOUS at 22:47

## 2017-10-21 RX ADMIN — Medication 81 MILLIGRAM(S): at 12:45

## 2017-10-21 RX ADMIN — ATORVASTATIN CALCIUM 10 MILLIGRAM(S): 80 TABLET, FILM COATED ORAL at 22:48

## 2017-10-21 RX ADMIN — SODIUM CHLORIDE 3 MILLILITER(S): 9 INJECTION INTRAMUSCULAR; INTRAVENOUS; SUBCUTANEOUS at 22:57

## 2017-10-21 RX ADMIN — LISINOPRIL 20 MILLIGRAM(S): 2.5 TABLET ORAL at 05:58

## 2017-10-21 RX ADMIN — Medication 2000 UNIT(S): at 12:45

## 2017-10-21 RX ADMIN — SODIUM CHLORIDE 3 MILLILITER(S): 9 INJECTION INTRAMUSCULAR; INTRAVENOUS; SUBCUTANEOUS at 15:17

## 2017-10-21 RX ADMIN — Medication 500 MILLIGRAM(S): at 05:58

## 2017-10-21 NOTE — PROGRESS NOTE ADULT - PROBLEM SELECTOR PLAN 1
Patient Hb stabilized over last 4 CBCs. No clinical signs of bleeding at this time. As per prior GI consult, patient can do outpatient workup for (+) FOBT.   Patient medically ready for discharge after discussion with Dr. Osborn and patient, particularly in lieu of Dr. Osborn's discussion with patient's orthopedic surgeon Dr. Locke - where likely patient developed post-operative bleeding.   PT saw patient, patient DC ready back to [facility] to continue recovery

## 2017-10-21 NOTE — PROGRESS NOTE ADULT - SUBJECTIVE AND OBJECTIVE BOX
CC: Anemia    SUBJECTIVE / OVERNIGHT EVENTS:     No complaints - "I'm cool and relaxed here on the chair"     Denies headache, weakness, malaise, fevers, chills, visual changes, chest pain, dyspnea, cough, abdominal pain, nausea, vomiting, diarrhea, constipation, dysuria, hematuria, polyuria, pruritis, joint pain    MEDICATIONS  (STANDING):  allopurinol 200 milliGRAM(s) Oral daily  amLODIPine   Tablet 5 milliGRAM(s) Oral daily  ascorbic acid 500 milliGRAM(s) Oral two times a day  aspirin enteric coated 81 milliGRAM(s) Oral daily  atorvastatin 10 milliGRAM(s) Oral at bedtime  carvedilol 12.5 milliGRAM(s) Oral every 12 hours  cholecalciferol 2000 Unit(s) Oral daily  docusate sodium 100 milliGRAM(s) Oral two times a day  enoxaparin Injectable 40 milliGRAM(s) SubCutaneous every 24 hours  folic acid 1 milliGRAM(s) Oral daily  influenza   Vaccine 0.5 milliLiter(s) IntraMuscular once  ketorolac 0.5% Ophthalmic Solution 1 Drop(s) Both EYES daily  lisinopril 20 milliGRAM(s) Oral daily  multivitamin 1 Tablet(s) Oral daily  pantoprazole    Tablet 40 milliGRAM(s) Oral before breakfast  senna 2 Tablet(s) Oral at bedtime  sodium chloride 0.9% lock flush 3 milliLiter(s) IV Push every 8 hours    MEDICATIONS  (PRN):  acetaminophen   Tablet. 650 milliGRAM(s) Oral every 6 hours PRN Mild Pain (1 - 3)  oxyCODONE    5 mG/acetaminophen 325 mG 1 Tablet(s) Oral every 6 hours PRN mod-sever pain      Vital Signs Last 24 Hrs  T(C): 37.4 (21 Oct 2017 05:56), Max: 37.4 (21 Oct 2017 05:56)  T(F): 99.4 (21 Oct 2017 05:56), Max: 99.4 (21 Oct 2017 05:56)  HR: 87 (21 Oct 2017 05:56) (86 - 95)  BP: 144/74 (21 Oct 2017 05:56) (131/70 - 144/74)  BP(mean): --  RR: 18 (21 Oct 2017 05:56) (18 - 18)  SpO2: 98% (21 Oct 2017 05:56) (98% - 100%)  CAPILLARY BLOOD GLUCOSE    PHYSICAL EXAM:  GENERAL: NAD, well-developed  HEAD:  Atraumatic, Normocephalic  EYES: EOMI, PERRLA, conjunctiva and sclera clear  NECK: Supple, No JVD  CHEST/LUNG: Clear to auscultation bilaterally; No wheeze  HEART: Regular rate and rhythm; No murmurs, rubs, or gallops  ABDOMEN: Soft, Nontender, Nondistended; Bowel sounds present  EXTREMITIES:  2+ Peripheral Pulses, No clubbing, cyanosis, or edema, (+) Left hip dressing at surgical site, with dried blood. No weeping through bandage   PSYCH: AAOx3  NEUROLOGY: non-focal  SKIN: No rashes or lesions    LABS:                        8.2    7.12  )-----------( 199      ( 20 Oct 2017 06:00 )             24.5     10-20    140  |  103  |  11  ----------------------------<  92  4.0   |  25  |  0.89    Ca    8.2<L>      20 Oct 2017 06:00                RADIOLOGY & ADDITIONAL TESTS:    Imaging Personally Reviewed:    Consultant(s) Notes Reviewed:      Care Discussed with Consultants/Other Providers:

## 2017-10-22 PROCEDURE — 99233 SBSQ HOSP IP/OBS HIGH 50: CPT

## 2017-10-22 RX ADMIN — Medication 650 MILLIGRAM(S): at 18:42

## 2017-10-22 RX ADMIN — Medication 500 MILLIGRAM(S): at 18:42

## 2017-10-22 RX ADMIN — LISINOPRIL 20 MILLIGRAM(S): 2.5 TABLET ORAL at 06:32

## 2017-10-22 RX ADMIN — ATORVASTATIN CALCIUM 10 MILLIGRAM(S): 80 TABLET, FILM COATED ORAL at 21:15

## 2017-10-22 RX ADMIN — CARVEDILOL PHOSPHATE 12.5 MILLIGRAM(S): 80 CAPSULE, EXTENDED RELEASE ORAL at 06:30

## 2017-10-22 RX ADMIN — Medication 81 MILLIGRAM(S): at 11:54

## 2017-10-22 RX ADMIN — Medication 200 MILLIGRAM(S): at 11:54

## 2017-10-22 RX ADMIN — Medication 1 TABLET(S): at 11:54

## 2017-10-22 RX ADMIN — SODIUM CHLORIDE 3 MILLILITER(S): 9 INJECTION INTRAMUSCULAR; INTRAVENOUS; SUBCUTANEOUS at 21:18

## 2017-10-22 RX ADMIN — Medication 1 DROP(S): at 11:54

## 2017-10-22 RX ADMIN — SODIUM CHLORIDE 3 MILLILITER(S): 9 INJECTION INTRAMUSCULAR; INTRAVENOUS; SUBCUTANEOUS at 06:32

## 2017-10-22 RX ADMIN — CARVEDILOL PHOSPHATE 12.5 MILLIGRAM(S): 80 CAPSULE, EXTENDED RELEASE ORAL at 18:42

## 2017-10-22 RX ADMIN — PANTOPRAZOLE SODIUM 40 MILLIGRAM(S): 20 TABLET, DELAYED RELEASE ORAL at 06:30

## 2017-10-22 RX ADMIN — AMLODIPINE BESYLATE 5 MILLIGRAM(S): 2.5 TABLET ORAL at 06:30

## 2017-10-22 RX ADMIN — Medication 500 MILLIGRAM(S): at 06:30

## 2017-10-22 RX ADMIN — SODIUM CHLORIDE 3 MILLILITER(S): 9 INJECTION INTRAMUSCULAR; INTRAVENOUS; SUBCUTANEOUS at 14:16

## 2017-10-22 RX ADMIN — ENOXAPARIN SODIUM 40 MILLIGRAM(S): 100 INJECTION SUBCUTANEOUS at 21:16

## 2017-10-22 RX ADMIN — Medication 2000 UNIT(S): at 11:54

## 2017-10-22 RX ADMIN — Medication 1 MILLIGRAM(S): at 11:54

## 2017-10-22 RX ADMIN — Medication 650 MILLIGRAM(S): at 19:31

## 2017-10-22 NOTE — PROGRESS NOTE ADULT - PROBLEM SELECTOR PLAN 5
pain control, X-ray with normal post-op findings.

## 2017-10-22 NOTE — PROGRESS NOTE ADULT - PROBLEM SELECTOR PROBLEM 2
Chronic systolic congestive heart failure

## 2017-10-22 NOTE — PROGRESS NOTE ADULT - SUBJECTIVE AND OBJECTIVE BOX
CC: Anemia    SUBJECTIVE / OVERNIGHT EVENTS:     no complaints at this time     Denies headache, weakness, malaise, fevers, chills, visual changes, chest pain, dyspnea, cough, abdominal pain, nausea, vomiting, diarrhea, constipation, dysuria, hematuria, polyuria, pruritis, joint pain    MEDICATIONS  (STANDING):  allopurinol 200 milliGRAM(s) Oral daily  amLODIPine   Tablet 5 milliGRAM(s) Oral daily  ascorbic acid 500 milliGRAM(s) Oral two times a day  aspirin enteric coated 81 milliGRAM(s) Oral daily  atorvastatin 10 milliGRAM(s) Oral at bedtime  carvedilol 12.5 milliGRAM(s) Oral every 12 hours  cholecalciferol 2000 Unit(s) Oral daily  docusate sodium 100 milliGRAM(s) Oral two times a day  enoxaparin Injectable 40 milliGRAM(s) SubCutaneous every 24 hours  folic acid 1 milliGRAM(s) Oral daily  influenza   Vaccine 0.5 milliLiter(s) IntraMuscular once  ketorolac 0.5% Ophthalmic Solution 1 Drop(s) Both EYES daily  lisinopril 20 milliGRAM(s) Oral daily  multivitamin 1 Tablet(s) Oral daily  pantoprazole    Tablet 40 milliGRAM(s) Oral before breakfast  senna 2 Tablet(s) Oral at bedtime  sodium chloride 0.9% lock flush 3 milliLiter(s) IV Push every 8 hours    MEDICATIONS  (PRN):  acetaminophen   Tablet. 650 milliGRAM(s) Oral every 6 hours PRN Mild Pain (1 - 3)  oxyCODONE    5 mG/acetaminophen 325 mG 1 Tablet(s) Oral every 6 hours PRN mod-sever pain      Vital Signs Last 24 Hrs  T(C): 36.7 (22 Oct 2017 06:09), Max: 37.3 (21 Oct 2017 21:48)  T(F): 98.1 (22 Oct 2017 06:09), Max: 99.2 (21 Oct 2017 21:48)  HR: 85 (22 Oct 2017 08:55) (85 - 91)  BP: 129/72 (22 Oct 2017 08:55) (125/72 - 158/87)  BP(mean): --  RR: 18 (22 Oct 2017 08:55) (18 - 18)  SpO2: 100% (22 Oct 2017 08:55) (99% - 100%)  CAPILLARY BLOOD GLUCOSE    PHYSICAL EXAM:  GENERAL: NAD, well-developed  HEAD:  Atraumatic, Normocephalic  EYES: EOMI, PERRLA, conjunctiva and sclera clear  NECK: Supple, No JVD  CHEST/LUNG: Clear to auscultation bilaterally; No wheeze  HEART: Regular rate and rhythm; No murmurs, rubs, or gallops  ABDOMEN: Soft, Nontender, Nondistended; Bowel sounds present  EXTREMITIES:  2+ Peripheral Pulses, No clubbing, cyanosis, or edema  PSYCH: AAOx3  NEUROLOGY: non-focal  SKIN: No rashes or lesions    LABS:                    RADIOLOGY & ADDITIONAL TESTS:    Imaging Personally Reviewed:    Consultant(s) Notes Reviewed:      Care Discussed with Consultants/Other Providers:

## 2017-10-22 NOTE — PROGRESS NOTE ADULT - PROBLEM SELECTOR PLAN 6
On Lovenox for DVT ppx    Patient DC ready back to Rehab, DC planning time 35 minutes
noted on both heparin and Lovenox PPX at rehab. will resume Lovenox PPX. no overt bleed and high risk of DVT in setting of recent hip replacement, will therefore cw DVT PPX despite occult blood + and anemia
On Lovenox for DVT ppx
On Lovenox for DVT ppx    Patient DC ready back to Rehab, possibly tomorrow, DC planning time 35 minutes

## 2017-10-22 NOTE — PROGRESS NOTE ADULT - ASSESSMENT
67 F with chronic anemia (neg bone marrow bx), HTN, HLD, gout, chronic systolic CHF w AICD, p/w worsening anemia s/p hip surgery 1 week ago- likely post op bleeding.

## 2017-10-23 VITALS
TEMPERATURE: 98 F | SYSTOLIC BLOOD PRESSURE: 154 MMHG | DIASTOLIC BLOOD PRESSURE: 87 MMHG | RESPIRATION RATE: 18 BRPM | HEART RATE: 90 BPM | OXYGEN SATURATION: 100 %

## 2017-10-23 LAB
BACTERIA BLD CULT: SIGNIFICANT CHANGE UP
BACTERIA BLD CULT: SIGNIFICANT CHANGE UP

## 2017-10-23 PROCEDURE — 99239 HOSP IP/OBS DSCHRG MGMT >30: CPT

## 2017-10-23 RX ORDER — ENOXAPARIN SODIUM 100 MG/ML
40 INJECTION SUBCUTANEOUS
Qty: 0 | Refills: 0 | COMMUNITY

## 2017-10-23 RX ADMIN — Medication 200 MILLIGRAM(S): at 13:37

## 2017-10-23 RX ADMIN — LISINOPRIL 20 MILLIGRAM(S): 2.5 TABLET ORAL at 07:03

## 2017-10-23 RX ADMIN — Medication 2000 UNIT(S): at 13:36

## 2017-10-23 RX ADMIN — PANTOPRAZOLE SODIUM 40 MILLIGRAM(S): 20 TABLET, DELAYED RELEASE ORAL at 07:03

## 2017-10-23 RX ADMIN — Medication 1 DROP(S): at 13:35

## 2017-10-23 RX ADMIN — Medication 500 MILLIGRAM(S): at 07:02

## 2017-10-23 RX ADMIN — SODIUM CHLORIDE 3 MILLILITER(S): 9 INJECTION INTRAMUSCULAR; INTRAVENOUS; SUBCUTANEOUS at 07:03

## 2017-10-23 RX ADMIN — Medication 1 TABLET(S): at 13:38

## 2017-10-23 RX ADMIN — SODIUM CHLORIDE 3 MILLILITER(S): 9 INJECTION INTRAMUSCULAR; INTRAVENOUS; SUBCUTANEOUS at 13:35

## 2017-10-23 RX ADMIN — Medication 1 MILLIGRAM(S): at 13:38

## 2017-10-23 RX ADMIN — CARVEDILOL PHOSPHATE 12.5 MILLIGRAM(S): 80 CAPSULE, EXTENDED RELEASE ORAL at 07:03

## 2017-10-23 RX ADMIN — Medication 81 MILLIGRAM(S): at 13:37

## 2017-10-23 RX ADMIN — AMLODIPINE BESYLATE 5 MILLIGRAM(S): 2.5 TABLET ORAL at 07:02

## 2017-10-23 NOTE — CHART NOTE - NSCHARTNOTEFT_GEN_A_CORE
pt seen at 1230p  she had no c/o pain  eager to go to rehab- has her stuff there  to f/u w/ Dr. Locke at [facility] as outpt.  time 40min

## 2020-06-19 NOTE — MEDICAL STUDENT ADULT H&P (EDUCATION) - NS MD HP STUD PMH FT
AICD  Anemia  CHF  HTN  HLD  Gout    Preventative screening:   Unknown last mammogram, colonoscopy, or pap smear.
1 Unit PRBC ordered

## 2022-12-05 NOTE — PHYSICAL THERAPY INITIAL EVALUATION ADULT - ORIENTATION, REHAB EVAL
oriented to person, place, time and situation
GEN: Patient awake alert NAD.   HEENT: normocephalic, atraumatic, EOMI, no scleral icterus, moist MM  CARDIAC: RRR, S1, S2, no murmur.   PULM: CTA B/L no wheeze, rhonchi, rales.   ABD: soft NT, ND, no rebound no guarding, no CVA tenderness.   MSK: B/l lumbar paraspinal tenderness. No midline tenderness. Right straight leg test positive. Moving all extremities, no edema. 5/5 strength and full ROM in all extremities.     NEURO: A&Ox3, no focal neurological deficits, CN 2-12 grossly intact  SKIN: warm, dry, no rash.

## 2024-08-23 NOTE — ED ADULT TRIAGE NOTE - AS O2 DELIVERY
History of Present Illness:  Presents for evaluation of left ankle.  The patient sustained an acute injury and notes pain and swelling.    The pain is sharp in nature, severe, worse with movement and better with rest.    Injury 1 week ago after fall from standing    Review of Systems   GENERAL: Negative for malaise, significant weight loss, fever  MUSCULOSKELETAL: see HPI  NEURO:  Negative    The patient's past medical history, family history, social history, and review of systems were reviewed. History is otherwise negative except as stated in the HPI.    Physical Examination:  Left lower extremity:  Skin healthy and intact  Swelling noted   Tenderness: Over the lateral mall.  No tenderness medially  Intact flexion and extension of digits  Neurovascular exam normal distally  2+ dorsalis pedis pulse and good cap refill    Imaging:  AP lateral oblique of the left ankle demonstrates minimally displaced lateral mall fracture.  No evidence of medial mall involvement.  Stable alignment on exam today compared to previous imaging    Assessment:  Patient with a left lateral malleolus fracture    Plan:  Discussed non-surgical management outlining a period of immobilization followed by rehabilitation and return to activities.  Short boot to remain on for 3 more weeks.  Follow-up in 1 month for repeat x-ray and transition to standard shoe        Follow up:  1 month     room air